# Patient Record
Sex: MALE
[De-identification: names, ages, dates, MRNs, and addresses within clinical notes are randomized per-mention and may not be internally consistent; named-entity substitution may affect disease eponyms.]

---

## 2017-05-09 ENCOUNTER — HOSPITAL ENCOUNTER (INPATIENT)
Dept: HOSPITAL 31 - C.ER | Age: 82
LOS: 3 days | Discharge: HOME | DRG: 390 | End: 2017-05-12
Payer: MEDICARE

## 2017-05-09 DIAGNOSIS — Z90.49: ICD-10-CM

## 2017-05-09 DIAGNOSIS — Z85.038: ICD-10-CM

## 2017-05-09 DIAGNOSIS — E78.1: ICD-10-CM

## 2017-05-09 DIAGNOSIS — E78.5: ICD-10-CM

## 2017-05-09 DIAGNOSIS — J45.909: ICD-10-CM

## 2017-05-09 DIAGNOSIS — I10: ICD-10-CM

## 2017-05-09 DIAGNOSIS — K56.60: Primary | ICD-10-CM

## 2017-05-09 DIAGNOSIS — E55.9: ICD-10-CM

## 2017-05-09 LAB
ALBUMIN/GLOB SERPL: 1.1 {RATIO} (ref 1–2.1)
ALP SERPL-CCNC: 78 U/L (ref 38–126)
ALT SERPL-CCNC: 32 U/L (ref 21–72)
APTT BLD: 30 SECONDS (ref 21–34)
AST SERPL-CCNC: 60 U/L (ref 17–59)
BACTERIA #/AREA URNS HPF: (no result) /[HPF]
BASOPHILS # BLD AUTO: 0 K/UL (ref 0–0.2)
BASOPHILS NFR BLD: 0.3 % (ref 0–2)
BILIRUB SERPL-MCNC: 1.4 MG/DL (ref 0.2–1.3)
BILIRUB UR-MCNC: NEGATIVE MG/DL
BUN SERPL-MCNC: 16 MG/DL (ref 9–20)
CALCIUM SERPL-MCNC: 9.3 MG/DL (ref 8.6–10.4)
CHLORIDE SERPL-SCNC: 99 MMOL/L (ref 98–107)
CO2 SERPL-SCNC: 27 MMOL/L (ref 22–30)
EOSINOPHIL # BLD AUTO: 0 K/UL (ref 0–0.7)
EOSINOPHIL NFR BLD: 0.1 % (ref 0–4)
EOSINOPHIL NFR BLD: 1 % (ref 0–4)
ERYTHROCYTE [DISTWIDTH] IN BLOOD BY AUTOMATED COUNT: 20.3 % (ref 11.5–14.5)
GLOBULIN SER-MCNC: 4.1 GM/DL (ref 2.2–3.9)
GLUCOSE SERPL-MCNC: 104 MG/DL (ref 75–110)
GLUCOSE UR STRIP-MCNC: NORMAL MG/DL
HCT VFR BLD CALC: 36.5 % (ref 35–51)
INR PPP: 1.1
KETONES UR STRIP-MCNC: NEGATIVE MG/DL
LEUKOCYTE ESTERASE UR-ACNC: (no result) LEU/UL
LYMPHOCYTES # BLD AUTO: 0.8 K/UL (ref 1–4.3)
LYMPHOCYTES NFR BLD AUTO: 5.9 % (ref 20–40)
MCH RBC QN AUTO: 22.5 PG (ref 27–31)
MCHC RBC AUTO-ENTMCNC: 31.4 G/DL (ref 33–37)
MCV RBC AUTO: 71.8 FL (ref 80–94)
MONOCYTES # BLD: 0.7 K/UL (ref 0–0.8)
MONOCYTES NFR BLD: 5.5 % (ref 0–10)
NEUTROPHILS NFR BLD AUTO: 87 % (ref 50–75)
NRBC BLD AUTO-RTO: 0.1 % (ref 0–2)
PH UR STRIP: 5 [PH] (ref 5–8)
PLATELET # BLD: 252 K/UL (ref 130–400)
PMV BLD AUTO: 8.8 FL (ref 7.2–11.7)
POTASSIUM SERPL-SCNC: 4.8 MMOL/L (ref 3.6–5.2)
PROT SERPL-MCNC: 8.8 G/DL (ref 6.3–8.3)
PROT UR STRIP-MCNC: (no result) MG/DL
RBC # UR STRIP: NEGATIVE /UL
RBC #/AREA URNS HPF: 3 /HPF (ref 0–3)
SODIUM SERPL-SCNC: 137 MMOL/L (ref 132–148)
SP GR UR STRIP: 1.02 (ref 1–1.03)
TOTAL CELLS COUNTED BLD: 100
UROBILINOGEN UR-MCNC: NORMAL MG/DL (ref 0.2–1)
WBC # BLD AUTO: 13.5 K/UL (ref 4.8–10.8)
WBC #/AREA URNS HPF: 3 /HPF (ref 0–5)

## 2017-05-09 NOTE — C.PDOC
Time Seen by Provider: 05/09/17 15:50


Chief Complaint (Nursing): Abdominal Pain


History Per: Patient, Family


Onset/Duration Of Symptoms: Hrs (since 11am this morning)


Current Symptoms Are (Timing): Still Present


Severity: Moderate


Location Of Pain/Discomfort: Diffuse


Quality Of Discomfort: Unable To Describe, "Pain"


Associated Symptoms: Nausea, Vomiting


Alleviating Factors: None


Last Bowel Movement: Today


Additional History Per: Prior Records





Past Medical History


Reviewed: Historical Data, Nursing Documentation, Vital Signs


Vital Signs: 


 Last Vital Signs











Temp  98 F   05/09/17 20:00


 


Pulse  85   05/09/17 20:00


 


Resp  16   05/09/17 20:00


 


BP  185/89 H  05/09/17 20:00


 


Pulse Ox  98   05/09/17 20:38














- Medical History


PMH: Asthma, Bronchitis, HTN, Hypercholesterolemia, Pneumonia (2011)


Surgical History: Appendectomy, Endoscopy, Hernia Repair


Other Surgeries: Colon resection.





- [facility] Procedures








INSPECTION OF LOWER INTESTINAL TRACT, ENDO (09/25/16)








Family History: States: Unknown Family Hx





- Social History


Hx Tobacco Use: No


Hx Alcohol Use: Yes (social ,last drink yesterday,3 beers)


Hx Substance Use: No





- Immunization History


Hx Tetanus Toxoid Vaccination: No


Hx Influenza Vaccination: Yes


Hx Pneumococcal Vaccination: Yes





Review Of Systems


Except As Marked, All Systems Reviewed And Found Negative.


Constitutional: Negative for: Fever, Weakness


Cardiovascular: Negative for: Chest Pain


Respiratory: Negative for: Shortness of Breath, Hemoptysis


Gastrointestinal: Positive for: Nausea, Vomiting, Abdominal Pain.  Negative for

: Diarrhea, Constipation, Melena, Hematochezia, Hematemesis


Genitourinary: Negative for: Dysuria


Musculoskeletal: Positive for: Back Pain.  Negative for: Neck Pain


Skin: Negative for: Rash


Neurological: Negative for: Weakness, Numbness, Seizures, Altered Mental Status

, Headache





Physical Exam





- Physical Exam


Appears: Non-toxic, No Acute Distress


Skin: Normal Color, Warm, Dry, No Rash


Head: Atraumatic, Normacephalic


Eye(s): bilateral: PERRL, EOMI


Neck: Normal ROM, Supple


Cardiovascular: Rhythm Regular


Respiratory: Normal Breath Sounds, No Accessory Muscle Use


Gastrointestinal/Abdominal: Soft, Tenderness (nonspecific)


Back: No CVA Tenderness


Male Genital: No Testicular Tenderness, No Testicular Swelling, No Scrotal 

Swelling


Extremity: Normal ROM


Neurological/Psych: Oriented x3, Normal Motor, Normal Sensation





ED Course And Treatment





- Laboratory Results


Result Diagrams: 


 05/09/17 16:42





 05/09/17 16:42


O2 Sat by Pulse Oximetry: 98


Pulse Ox Interpretation: Normal





- CT Scan/US


  ** CT abd/pelv


Other Rad Studies (CT/US): Read By Radiologist, Radiology Report Reviewed


CT/US Interpretation: SBO.





- Physician Consult Information


Physician Contacted: Ilya Garcia (Surg.)


Outcome Of Conversation: He will consult. Pt also signed out to the surgical 

resident on call.





Progress





- Interventions


Interventions:: Observation, Intravenous fluid





- Medications Administered


Intravenous: Antiemetic, Other (PPI)





- Data Reviewed


Data Reviewed: Lab, Diagnostic imaging, Old records





- Patient Status


Patient status: Partially improved





- Continuity of Care


Discussed patient case with:: Patient, Family-HIPPA compliant, ED Nurse, PMD


Discussed pt. case with consultant/specialty: General Surgery





- Patient Plan


Patient Plan: Admission





Disposition


Discussed With Dr.: Gem Lewis


Comment: He accepted pt on his service and requested Dr. Garcia be consulted 

for surgery.


Doctor Will See Patient In The: Hospital


Counseled Patient/Family Regarding: Studies Performed, Diagnosis





- Disposition


Disposition: HOSPITALIZED


Disposition Time: 21:03


Condition: GUARDED





- Clinical Impression


Clinical Impression: 


 SBO (small bowel obstruction)

## 2017-05-09 NOTE — CP.PCM.CON
History of Present Illness





- History of Present Illness


History of Present Illness: 





Surgery: Dr. Garcia 





Reason for consult: SBO





CC: vomiting, multiple episodes 





HPI: Patient is an 83 y/o male w/ extensive past surgical history who presents 

complaining of diffuse abdominal pain, nausea, and multiple episodes of 

vomiting that started this morning. Patient describes the emesis is bilious, 

nonbloody. He reports the vomiting started spontaneously this morning without 

provocating event. He complains of excessive belching and abdominal bloating. 

He describes the pain in his stomach as dull mostly in the epigastric area 

radiating to remainder of abdomen and right flank. He reports similar symptoms 

in the past which required a tube in his nose back in 2015. Eventually his 

obstruction resolved with conservative treatment only. 





PMH: HTN, HLD, asthma, colon ca s/p partial colectomy and chemotherapy 


PSH: partial colectomy w/ reanastomosis, at some point patient had ostomy, 

possible diverting (patient unsure), in which he developed a para-stomal hernia 

requiring repair,lap appendectomy, colonoscopy 


social: lives at home alone, performs all ADLS, denies tobacco use, social ETOH 

use 


PMD: Dr. Lewis 





Review of Systems





- Review of Systems


All systems: reviewed and no additional remarkable complaints except


Review of Systems: 





unless stated in HPI





- Constitutional


Constitutional: absent: Chills, Fever





- EENT


Eyes: absent: Blurred Vision, Change in Vision


Ears: Dizziness.  absent: Disequilibrium


Nose/Mouth/Throat: absent: Nasal Congestion, Nasal Trauma





- Cardiovascular


Cardiovascular: absent: Chest Pain, Syncope





- Respiratory


Respiratory: absent: Cough, Wheezing





- Gastrointestinal


Gastrointestinal: Abdominal Pain, Belching, Bloating, Constipation, Cramping, 

Nausea, Vomiting.  absent: Coffee Ground Emesis, Diarrhea





- Genitourinary


Genitourinary: absent: Dysuria, Hematuria





- Musculoskeletal


Musculoskeletal: absent: Numbness, Tingling





- Psychiatric


Psychiatric: absent: Confusion, Depression





- Endocrine


Endocrine: absent: Polydipsia, Polyphagia





- Hematologic/Lymphatic


Hematologic: absent: Easy Bleeding, Easy Bruising





Past Patient History





- Infectious Disease


Hx of Infectious Diseases: None





- Past Medical History & Family History


Past Medical History?: Yes





- Past Social History


Smoking Status: Never Smoked





- CARDIAC


Hx Hypercholesterolemia: Yes


Hx Hypertension: Yes





- PULMONARY


Hx Asthma: Yes


Hx Bronchitis: Yes


Hx Pneumonia: Yes (2011)





- NEUROLOGICAL


Hx Neurological Disorder: No





- HEENT


Hx HEENT Problems: Yes


Other/Comment: Difficulty hearing of R Ear





- RENAL


Hx Chronic Kidney Disease: No





- ENDOCRINE/METABOLIC


Hx Endocrine Disorders: No





- HEMATOLOGICAL/ONCOLOGICAL


Hx Blood Disorders: Yes


Hx Cancer: Yes (colorectal)


Hx Chemotherapy: Yes (2003)





- INTEGUMENTARY


Hx Dermatological Problems: No





- MUSCULOSKELETAL/RHEUMATOLOGICAL


Hx Musculoskeletal Disorders: No


Hx Falls: No





- GASTROINTESTINAL


Hx Gastrointestinal Disorders: Yes


Other/Comment: COLON CANCER





- GENITOURINARY/GYNECOLOGICAL


Hx Genitourinary Disorders: No





- PSYCHIATRIC


Hx Substance Use: No





- SURGICAL HISTORY


Hx Appendectomy: Yes





- ANESTHESIA


Hx Anesthesia: Yes


Hx Anesthesia Reactions: No


Hx Malignant Hyperthermia: No





Meds


Allergies/Adverse Reactions: 


 Allergies











Allergy/AdvReac Type Severity Reaction Status Date / Time


 


diphenhydramine HCl AdvReac  SHORTNESS Verified 09/26/16 17:11





[From Benadryl]   OF BREATH  














Physical Exam





- Constitutional


Appears: Non-toxic, No Acute Distress





- Head Exam


Head Exam: ATRAUMATIC, NORMOCEPHALIC





- Eye Exam


Eye Exam: EOMI, Normal appearance





- ENT Exam


ENT Exam: Mucous Membranes Moist





- Respiratory Exam


Respiratory Exam: NORMAL BREATHING PATTERN.  absent: Respiratory Distress





- Cardiovascular Exam


Cardiovascular Exam: REGULAR RHYTHM.  absent: Tachycardia





- GI/Abdominal Exam


GI & Abdominal Exam: Distended, Soft, Tenderness (mainly epigastric ).  absent: 

Guarding, Rebound, Rigid


Additional comments: 





midline surgical incisions scar noted





- Extremities Exam


Extremities exam: Positive for: normal inspection.  Negative for: calf 

tenderness





- Neurological Exam


Neurological exam: Alert





- Psychiatric Exam


Psychiatric exam: Normal Affect, Normal Mood





- Skin


Skin Exam: Dry, Intact, Warm





Results





- Vital Signs


Recent Vital Signs: 


 Last Vital Signs











Temp  98 F   05/09/17 20:00


 


Pulse  85   05/09/17 20:00


 


Resp  16   05/09/17 20:00


 


BP  185/89 H  05/09/17 20:00


 


Pulse Ox  98   05/09/17 21:04














- Labs


Result Diagrams: 


 05/09/17 16:42





 05/09/17 16:42





Assessment & Plan





- Assessment and Plan (Free Text)


Assessment: 





83 y/o male w/ small bowel obstruction


Plan: 





-CT shows SBO, w/ moderate hiatal hernia


-NGT on low continuous suction


-NPO


-IVFs


-electrolyte repletion prn 


-serial abdominal exams 


-am labs 


-zofran for nausea 


-d/w Dr. Radha Galindo

## 2017-05-10 LAB
ALBUMIN/GLOB SERPL: 1.2 {RATIO} (ref 1–2.1)
ALP SERPL-CCNC: 67 U/L (ref 38–126)
ALT SERPL-CCNC: 30 U/L (ref 21–72)
AST SERPL-CCNC: 29 U/L (ref 17–59)
BASOPHILS # BLD AUTO: 0 K/UL (ref 0–0.2)
BASOPHILS NFR BLD: 0.3 % (ref 0–2)
BILIRUB SERPL-MCNC: 0.9 MG/DL (ref 0.2–1.3)
BUN SERPL-MCNC: 12 MG/DL (ref 9–20)
CALCIUM SERPL-MCNC: 8.6 MG/DL (ref 8.6–10.4)
CHLORIDE SERPL-SCNC: 102 MMOL/L (ref 98–107)
CO2 SERPL-SCNC: 25 MMOL/L (ref 22–30)
EOSINOPHIL # BLD AUTO: 0 K/UL (ref 0–0.7)
EOSINOPHIL NFR BLD: 0.3 % (ref 0–4)
ERYTHROCYTE [DISTWIDTH] IN BLOOD BY AUTOMATED COUNT: 20.3 % (ref 11.5–14.5)
GLOBULIN SER-MCNC: 3.2 GM/DL (ref 2.2–3.9)
GLUCOSE SERPL-MCNC: 87 MG/DL (ref 75–110)
HCT VFR BLD CALC: 32.8 % (ref 35–51)
LYMPHOCYTES # BLD AUTO: 1.1 K/UL (ref 1–4.3)
LYMPHOCYTES NFR BLD AUTO: 13 % (ref 20–40)
MAGNESIUM SERPL-MCNC: 2.3 MG/DL (ref 1.6–2.3)
MCH RBC QN AUTO: 22.8 PG (ref 27–31)
MCHC RBC AUTO-ENTMCNC: 32.3 G/DL (ref 33–37)
MCV RBC AUTO: 70.8 FL (ref 80–94)
MONOCYTES # BLD: 1 K/UL (ref 0–0.8)
MONOCYTES NFR BLD: 11.5 % (ref 0–10)
NRBC BLD AUTO-RTO: 0.1 % (ref 0–2)
PLATELET # BLD: 231 K/UL (ref 130–400)
PMV BLD AUTO: 9.1 FL (ref 7.2–11.7)
POTASSIUM SERPL-SCNC: 3.6 MMOL/L (ref 3.6–5.2)
PROT SERPL-MCNC: 7 G/DL (ref 6.3–8.3)
SODIUM SERPL-SCNC: 137 MMOL/L (ref 132–148)
WBC # BLD AUTO: 8.5 K/UL (ref 4.8–10.8)

## 2017-05-10 NOTE — CP.PCM.PN
Subjective





- Date & Time of Evaluation


Date of Evaluation: 05/10/17


Time of Evaluation: 07:53





- Subjective


Subjective: 





Surgery: Dr. Garcia





Pt seen and examined Resting comfortably in bed. Pain controlled. No N/V. 

Passing flatus no BM.





Objective





- Vital Signs/Intake and Output


Vital Signs (last 24 hours): 


 











Temp Pulse Resp BP Pulse Ox


 


 98.3 F   80   20   156/73 H  96 


 


 05/10/17 00:00  05/10/17 00:00  05/10/17 00:00  05/10/17 00:00  05/10/17 00:00








Intake and Output: 


 











 05/10/17 05/10/17





 06:59 18:59


 


Output Total 1200 


 


Balance -1200 














- Medications


Medications: 


 Current Medications





Heparin Sodium (Porcine) (Heparin)  5,000 units SC Q12 [facility]


Hydromorphone HCl (Dilaudid)  0.5 mg IVP Q4H PRN


   PRN Reason: Pain, moderate (4-7)


Lactated Ringer's (Lactated Ringer's)  1,000 mls @ 120 mls/hr IV .Q8H20M [facility]


   Last Admin: 05/10/17 06:30 Dose:  120 mls/hr


Ondansetron HCl (Zofran Inj)  4 mg IVP Q4 [facility]


   Last Admin: 05/10/17 05:00 Dose:  4 mg


Pantoprazole Sodium (Protonix Inj)  40 mg IVP DAILY [facility]











- Labs


Labs: 


 











PT  12.3 SECONDS (9.7-12.2)  H  05/09/17  16:42    


 


INR  1.1   05/09/17  16:42    


 


APTT  30 SECONDS (21-34)   05/09/17  16:42    














- Constitutional


Appears: Non-toxic, No Acute Distress





- Head Exam


Head Exam: ATRAUMATIC, NORMOCEPHALIC





- Eye Exam


Eye Exam: EOMI





- ENT Exam


ENT Exam: Mucous Membranes Moist, Normal External Ear Exam (NGT in place)





- Neck Exam


Neck Exam: Full ROM





- Respiratory Exam


Respiratory Exam: NORMAL BREATHING PATTERN.  absent: Accessory Muscle Use, 

Respiratory Distress





- GI/Abdominal Exam


GI & Abdominal Exam: Firm (B/L lower quadrants), Soft.  absent: Distended, 

Guarding, Rigid, Tenderness, Rebound





- Extremities Exam


Extremities Exam: absent: Calf Tenderness, Pedal Edema





- Neurological Exam


Neurological Exam: Alert, Awake, Oriented x3





- Psychiatric Exam


Psychiatric exam: Normal Mood





Assessment and Plan





- Assessment and Plan (Free Text)


Assessment: 





82M w. SBO 


-NGT 1100cc initially in ED, 300cs overnight, non-bilious


-Keep NGT to suction


-NPO


-IVF


-pain meds


-serial exams


-monitor bowel fxn


-d/w attending





[facility] PGY2

## 2017-05-10 NOTE — CT
PROCEDURE:  CT Abdomen and Pelvis with contrast



HISTORY:

Abd pain and vomiting.



COMPARISON:

None.



TECHNIQUE:

Contrast dose: 100 mL Visipaque 320



Radiation dose:



Total exam DLP = 489.65 mGy-cm.



This CT exam was performed using one or more of the following dose 

reduction techniques: Automated exposure control, adjustment of the 

mA and/or kV according to patient size, and/or use of iterative 

reconstruction technique.



FINDINGS:



LOWER THORAX:

Moderate hiatal hernia. 2 mm subpleural nodule anterior segment right 

upper lobe.  4 mm subpleural nodule in right middle lobe. The 4 mm 

nodule is evident on prior examinations dating back to 1/30/2015. 4 

mm nodule abutting or within the left major fissure, in the superior 

segment left lower lobe.  Possible intrapulmonary lymph node.  This 

is not included on prior examinations. By a Fleischner society 

criteria, no followup is advised for these nodules, given their size. 



LIVER:

Unremarkable. No gross lesion or ductal dilatation. 



GALLBLADDER AND BILE DUCTS:

Cholelithiasis. No mural thickening or pericholecystic fluid. 



PANCREAS:

Unremarkable.8 mm rounded fat density mass in the distal pancreatic 

body, likely a lipoma.  Unchanged compared to several prior 

examinations. No other pancreatic mass.  No evidence of pancreatitis. 

No ductal dilatation. .



SPLEEN:

Unremarkable. 



ADRENALS:

Unremarkable. No mass. 



KIDNEYS AND URETERS:

Right upper pole cortical cyst, 1.6 cm. 10 mm cyst mid left kidney, 

stable. No calculus or hydronephrosis. 



VASCULATURE:

Unremarkable. No aortic aneurysm. 



BOWEL:

Mechanical small-bowel obstruction. Point of obstruction identified.  

This can be demonstrated on series 3, image 125 and series 601, image 

30. This is approximately at the level of the distal jejunum or 

jejunoileal junction. Decompressed small bowel loops distal to this 

point of caliber change. Status post sigmoidectomy with colo rectal 

anastomosis. Evidence of prior appendectomy. 



APPENDIX:

Status post appendectomy 



PERITONEUM:

Right ventral abdominal mesh noted. No ascites. 



LYMPH NODES:

Unremarkable. No enlarged lymph nodes. 



BLADDER:

Unremarkable. 



REPRODUCTIVE:

Normal prostate 



BONES:

Stable small rounded lucent lesion in the left side of the L1 

vertebral body, unchanged compared to 1/30/2015. 6 mm diameter.  No 

other lytic or blastic lesions identified. Nonspecific finding. 



OTHER FINDINGS:

None.



IMPRESSION:

Mechanical small-bowel obstruction at approximately the level of the 

jejunoileal junction. Moderate hiatal hernia. Cholelithiasis without 

evidence of cholecystitis. Additional minor findings as detailed 

above.



Preliminary interpretation of this examination was reported by 

[facility] at 8:03 p.m. on 5/9/2017. There is concurrence of 

this report with the preliminary interpretation.

## 2017-05-11 LAB
ALBUMIN/GLOB SERPL: 1.2 {RATIO} (ref 1–2.1)
ALP SERPL-CCNC: 65 U/L (ref 38–126)
ALT SERPL-CCNC: 24 U/L (ref 21–72)
AST SERPL-CCNC: 28 U/L (ref 17–59)
BASOPHILS # BLD AUTO: 0 K/UL (ref 0–0.2)
BASOPHILS NFR BLD: 0.4 % (ref 0–2)
BILIRUB SERPL-MCNC: 0.8 MG/DL (ref 0.2–1.3)
BUN SERPL-MCNC: 9 MG/DL (ref 9–20)
CALCIUM SERPL-MCNC: 8.5 MG/DL (ref 8.6–10.4)
CHLORIDE SERPL-SCNC: 107 MMOL/L (ref 98–107)
CO2 SERPL-SCNC: 26 MMOL/L (ref 22–30)
EOSINOPHIL # BLD AUTO: 0.1 K/UL (ref 0–0.7)
EOSINOPHIL NFR BLD: 2.5 % (ref 0–4)
ERYTHROCYTE [DISTWIDTH] IN BLOOD BY AUTOMATED COUNT: 19.7 % (ref 11.5–14.5)
GLOBULIN SER-MCNC: 3.2 GM/DL (ref 2.2–3.9)
GLUCOSE SERPL-MCNC: 83 MG/DL (ref 75–110)
HCT VFR BLD CALC: 32.8 % (ref 35–51)
LYMPHOCYTES # BLD AUTO: 1 K/UL (ref 1–4.3)
LYMPHOCYTES NFR BLD AUTO: 17.6 % (ref 20–40)
MCH RBC QN AUTO: 22.6 PG (ref 27–31)
MCHC RBC AUTO-ENTMCNC: 31.8 G/DL (ref 33–37)
MCV RBC AUTO: 71.1 FL (ref 80–94)
MONOCYTES # BLD: 0.7 K/UL (ref 0–0.8)
MONOCYTES NFR BLD: 11.7 % (ref 0–10)
NRBC BLD AUTO-RTO: 0 % (ref 0–2)
PLATELET # BLD: 216 K/UL (ref 130–400)
PMV BLD AUTO: 8.8 FL (ref 7.2–11.7)
POTASSIUM SERPL-SCNC: 3.9 MMOL/L (ref 3.6–5.2)
PROT SERPL-MCNC: 6.9 G/DL (ref 6.3–8.3)
SODIUM SERPL-SCNC: 138 MMOL/L (ref 132–148)
WBC # BLD AUTO: 5.9 K/UL (ref 4.8–10.8)

## 2017-05-11 NOTE — CARD
--------------- APPROVED REPORT --------------





EKG Measurement

Heart [id number]

ID [id number]

[id number]

[id number]

[id number]



<Conclusion>

Normal sinus rhythm

Nonspecific ST and T wave abnormality

Abnormal ECG

## 2017-05-11 NOTE — CP.PCM.PN
Subjective





- Date & Time of Evaluation


Date of Evaluation: 05/11/17


Time of Evaluation: 09:45





- Subjective


Subjective: 





Surgery: Dr. Garcia 





Patient doing well today. Patient denies abdominal pain, n/v/f/c. He reports 2 

BMs, one last night and 1 this am. + flatus. Tolerating clears.





Objective





- Vital Signs/Intake and Output


Vital Signs (last 24 hours): 


 











Temp Pulse Resp BP Pulse Ox


 


 97.8 F   67   20   164/78 H  97 


 


 05/11/17 00:00  05/11/17 06:00  05/11/17 06:00  05/11/17 06:00  05/11/17 06:00








Intake and Output: 


 











 05/11/17 05/11/17





 06:59 18:59


 


Intake Total 1300 


 


Output Total 900 


 


Balance 400 














- Medications


Medications: 


 Current Medications





Heparin Sodium (Porcine) (Heparin)  5,000 units SC Q12 [facility]


   Last Admin: 05/10/17 22:26 Dose:  5,000 units


Hydromorphone HCl (Dilaudid)  0.5 mg IVP Q4H PRN


   PRN Reason: Pain, moderate (4-7)


Lactated Ringer's (Lactated Ringer's)  1,000 mls @ 120 mls/hr IV .Q8H20M [facility]


   Last Admin: 05/10/17 23:30 Dose:  Not Given


Ondansetron HCl (Zofran Inj)  4 mg IVP Q4 [facility]


   Last Admin: 05/11/17 04:45 Dose:  4 mg


Pantoprazole Sodium (Protonix Inj)  40 mg IVP DAILY [facility]


   Last Admin: 05/10/17 11:35 Dose:  40 mg











- Labs


Labs: 


 





 05/11/17 07:04 





 05/11/17 07:04 





 











PT  12.3 SECONDS (9.7-12.2)  H  05/09/17  16:42    


 


INR  1.1   05/09/17  16:42    


 


APTT  30 SECONDS (21-34)   05/09/17  16:42    














- Constitutional


Appears: Non-toxic, No Acute Distress





- Head Exam


Head Exam: ATRAUMATIC, NORMOCEPHALIC





- Eye Exam


Eye Exam: EOMI, Normal appearance





- ENT Exam


ENT Exam: Mucous Membranes Moist





- Respiratory Exam


Respiratory Exam: NORMAL BREATHING PATTERN.  absent: Respiratory Distress





- Cardiovascular Exam


Cardiovascular Exam: REGULAR RHYTHM.  absent: Tachycardia





- GI/Abdominal Exam


GI & Abdominal Exam: Soft.  absent: Distended, Guarding, Tenderness, Rebound





- Neurological Exam


Neurological Exam: Alert, Awake





Assessment and Plan





- Assessment and Plan (Free Text)


Assessment: 





81 y/o male w/ SBO-resolving 


Plan: 





-ADAT 


-encourage ambulation


-serial abdominal exams


-if continues to tolerate diet and have bowel function, is cleared from 

surgical standpoint


-further recs per Dr. Radha Galindo PGY1

## 2017-05-11 NOTE — HP
HISTORY OF PRESENT ILLNESS:  This is an 82-years old  male with history 
of multiple medical problems who presented to Emergency Room with abdominal 
pain and vomiting for 1 days duration.  The patient had previous similar 
presentation 

CAT scan of the abdomen and pelvis was done in the Emergency Room and was 
diagnosed with small-bowel obstruction , cholelithiasis without evidence of 
cholecystitis  .  



REVIEW OF SYSTEMS: Other review of systems is negative.



ALLERGIES: NKA.



MEDICATIONS:  Norvasc 5 mg daily, Ultram 50 mg daily, fenofibrate 200 mg daily, 
Crestor 10 mg daily and aspirin 81 mg daily.



SOCIAL HISTORY:  No history of smoking, ETOH or substance abuse.



FAMILY HISTORY:  Not contributory.



PAST MEDICAL HISTORY:  History of  hypertension .



PHYSICAL EXAMINATION:

GENERAL:  The patient is in bed, comfortable, not in cardiopulmonary distress.

VITAL SIGNS: /70  temperature 98.2, respiratory rate 20 and pulse is 71.

HEENT:  Pupils equal and reactive to light.  Normal-appearing mucosa of the 
conjunctivae, oropharyngeal and nasal membrane mucosa.

NECK:  Supple, no JVD, no carotid bruit, no lymph node and no thyromegaly.

CHEST AND LUNGS:  Bilateral symmetrical expansion, good air exchange, no rales 
and no rhonchi.

CARDIOVASCULAR:  PMI not localized.  S1, S2.  No additional sounds.

ABDOMEN:  Normoactive bowel sounds, no tenderness, no organomegaly and no 
masses.

EXTREMITIES:  No cyanosis, no clubbing , no edema



ASSESSMENT:  

1.  Small bowel obstruction. 

2. History of hypertension.

3.  Hypertriglyceridemia.



PLAN:  At this point, we will remove the nasogastric tube as the patient passed 
flatus and abdominal pain is less.  We will advanc diet to clear liquids as 
tolerated.





__________________________________________

Gem Lewis MD







cc:   



DD: 05/10/2017 20:28:39  167

TT: 05/10/2017 21:04:20

Job # [id number]

sn

MTDD

## 2017-05-11 NOTE — PN
DATE: 05/11/2017



SUBJECTIVE:  The patient is seen today.  He has less abdominal pain, and he is 
still on liquid diet and IV fluids.



PHYSICAL EXAMINATION:

VITAL SIGNS:  Blood pressure 135/75, temperature 98.7, respiratory rate 20, and 
pulse 67.

HEENT:  Pupils equal, reactive to light.  Normal-appearing mucosa of the 
conjunctivae, oropharyngeal, and nasal membrane mucosa.

NECK:  Supple.  No JVD.  No carotid bruit.  No lymph node.  No thyromegaly.

CHEST AND LUNGS:  Bilateral symmetrical expansion.  No rhonchi.

CARDIOVASCULAR:  PMI not localized.  S1, S2.  No additional sounds.

ABDOMEN:  Normoactive bowel sounds.  No tenderness.  No organomegaly.  No 
masses.

EXTREMITIES:  No cyanosis.  No clubbing.  No edema.

CENTRAL NERVOUS SYSTEM:  Alert, awake, oriented x 3.  No neurological deficits 
could be appreciated.



ASSESSMENT:

Small-bowel obstruction, which is resolving, and currently the patient is 
tolerating liquid diet.  



PLAN:

We will advance to regular diet, and if the patient is tolerating, we will 
discharge home.  We will discontinue IV fluid.





__________________________________________

Samephilipp Lewis MD







cc:   



DD: 05/11/2017 23:26:00  167

TT: 05/11/2017 23:57:56

Confirmation # [id number]

Dictation # [id number]

tn

[email or web address]

## 2017-05-12 VITALS
DIASTOLIC BLOOD PRESSURE: 74 MMHG | SYSTOLIC BLOOD PRESSURE: 160 MMHG | HEART RATE: 72 BPM | TEMPERATURE: 98.1 F | OXYGEN SATURATION: 95 %

## 2017-05-12 VITALS — RESPIRATION RATE: 20 BRPM

## 2017-05-12 NOTE — CP.PCM.PN
Subjective





- Date & Time of Evaluation


Date of Evaluation: 05/12/17


Time of Evaluation: 08:30





- Subjective


Subjective: 





PGY1 Progress Note for Dr. Garcia:





Patient seen and examined.  Patient states he feels well and denies abdominal 

pain, nausea, vomiting. Patient reports BM this morning and states he is eating 

well. 





Objective





- Vital Signs/Intake and Output


Vital Signs (last 24 hours): 


 











Temp Pulse Resp BP Pulse Ox


 


 98.1 F   72   20   160/74 H  95 


 


 05/12/17 08:10  05/12/17 08:10  05/12/17 08:10  05/12/17 08:10  05/12/17 08:10








Intake and Output: 


 











 05/12/17 05/12/17





 06:59 18:59


 


Intake Total 240 


 


Balance 240 














- Medications


Medications: 


 Current Medications





Heparin Sodium (Porcine) (Heparin)  5,000 units SC Q12 [facility]


   Last Admin: 05/11/17 22:00 Dose:  5,000 units


Ondansetron HCl (Zofran Inj)  4 mg IVP Q4 [facility]


   Last Admin: 05/12/17 08:03 Dose:  4 mg


Pantoprazole Sodium (Protonix Inj)  40 mg IVP DAILY [facility]


   Last Admin: 05/12/17 08:03 Dose:  40 mg











- Labs


Labs: 


 





 05/11/17 07:04 





 05/11/17 07:04 





 











PT  12.3 SECONDS (9.7-12.2)  H  05/09/17  16:42    


 


INR  1.1   05/09/17  16:42    


 


APTT  30 SECONDS (21-34)   05/09/17  16:42    














- Constitutional


Appears: Non-toxic, No Acute Distress





- Head Exam


Head Exam: ATRAUMATIC, NORMOCEPHALIC





- Eye Exam


Eye Exam: EOMI





- ENT Exam


ENT Exam: Mucous Membranes Moist





- Respiratory Exam


Respiratory Exam: NORMAL BREATHING PATTERN





- GI/Abdominal Exam


GI & Abdominal Exam: Soft.  absent: Distended, Guarding, Tenderness





- Neurological Exam


Neurological Exam: Alert, Awake





- Psychiatric Exam


Psychiatric exam: Normal Affect





- Skin


Skin Exam: Warm





Assessment and Plan





- Assessment and Plan (Free Text)


Assessment: 





82 year old male with resolving SBO





-patient tolerating regular diet


-patient cleared for discharge from surgical standpoint


-D/W Dr. Garcia

## 2017-05-12 NOTE — CP.PCM.PN
Subjective





- Date & Time of Evaluation


Date of Evaluation: 05/12/17


Time of Evaluation: 11:00





- Subjective


Subjective: 





Awake, alert, no abdominal pain, tolerating regular diet, NAD.





Objective





- Vital Signs/Intake and Output


Vital Signs (last 24 hours): 


 











Temp Pulse Resp BP Pulse Ox


 


 98.1 F   72   20   160/74 H  95 


 


 05/12/17 08:10  05/12/17 08:10  05/12/17 08:10  05/12/17 08:10  05/12/17 08:10








Intake and Output: 


 











 05/12/17 05/12/17





 06:59 18:59


 


Intake Total 240 500


 


Balance 240 500














- Labs


Labs: 


 





 05/11/17 07:04 





 05/11/17 07:04 





 











PT  12.3 SECONDS (9.7-12.2)  H  05/09/17  16:42    


 


INR  1.1   05/09/17  16:42    


 


APTT  30 SECONDS (21-34)   05/09/17  16:42    














Assessment and Plan





- Assessment and Plan (Free Text)


Assessment: 





Patient admitted with small bowel obstruction, seen and examined, tolerating 

regular diet, no abdominal pain. Cleared by surgery. D/W DR Lewis, plan for 

discharge home today. Advised to follow up in the office in 1 week.

## 2017-05-14 NOTE — DS
This is a late entry for discharge summary done on 5/12/2017.



REASON FOR ADMISSION:  This is an 82-year-old  male with history of multiple medical problems
 including previous abdominal surgeries who was admitted for small-bowel obstruction.



COURSE OF HOSPITALIZATION:  The patient was admitted to medical floor, and he had a nasogastric tube 
placed.  The patient had a surgical consultation done by Dr. Garcia.  The patient's abdominal pain
 was relieved, and the patient started to have flatus.  The patient's nasogastric tube was removed, a
nd started on liquid diet that he tolerated well.  Diet was advanced to regular diet, and the patient
 tolerated and discharged home in a stable condition to continue the preadmission medications.



FINAL DIAGNOSES:  Mechanical small-bowel obstruction.  Vitamin D deficiency.  Hypertriglyceridemia.





__________________________________________

Gem Lewis MD







cc:



DD: 05/13/2017 20:51:27  167

TT: 05/14/2017 09:13:56

Job # [id number]

jn

## 2017-09-04 ENCOUNTER — HOSPITAL ENCOUNTER (EMERGENCY)
Dept: HOSPITAL 31 - C.ER | Age: 82
LOS: 1 days | Discharge: HOME | End: 2017-09-05
Payer: MEDICARE

## 2017-09-04 DIAGNOSIS — R11.10: Primary | ICD-10-CM

## 2017-09-04 DIAGNOSIS — I10: ICD-10-CM

## 2017-09-04 LAB
ALBUMIN/GLOB SERPL: 1.2 {RATIO} (ref 1–2.1)
ALP SERPL-CCNC: 67 U/L (ref 38–126)
ALT SERPL-CCNC: 28 U/L (ref 21–72)
AST SERPL-CCNC: 24 U/L (ref 17–59)
BACTERIA #/AREA URNS HPF: (no result) /[HPF]
BASOPHILS # BLD AUTO: 0 K/UL (ref 0–0.2)
BASOPHILS NFR BLD: 0.2 % (ref 0–2)
BASOPHILS NFR BLD: 1 % (ref 0–2)
BILIRUB SERPL-MCNC: 0.6 MG/DL (ref 0.2–1.3)
BILIRUB UR-MCNC: NEGATIVE MG/DL
BUN SERPL-MCNC: 13 MG/DL (ref 9–20)
CALCIUM SERPL-MCNC: 9.6 MG/DL (ref 8.6–10.4)
CHLORIDE SERPL-SCNC: 106 MMOL/L (ref 98–107)
CO2 SERPL-SCNC: 20 MMOL/L (ref 22–30)
EOSINOPHIL # BLD AUTO: 0 K/UL (ref 0–0.7)
EOSINOPHIL NFR BLD: 0.3 % (ref 0–4)
ERYTHROCYTE [DISTWIDTH] IN BLOOD BY AUTOMATED COUNT: 18.9 % (ref 11.5–14.5)
GLOBULIN SER-MCNC: 3.4 GM/DL (ref 2.2–3.9)
GLUCOSE SERPL-MCNC: 134 MG/DL (ref 75–110)
GLUCOSE UR STRIP-MCNC: NORMAL MG/DL
HCT VFR BLD CALC: 33.7 % (ref 35–51)
KETONES UR STRIP-MCNC: NEGATIVE MG/DL
LEUKOCYTE ESTERASE UR-ACNC: (no result) LEU/UL
LYMPHOCYTES # BLD AUTO: 0.7 K/UL (ref 1–4.3)
LYMPHOCYTES NFR BLD AUTO: 7.1 % (ref 20–40)
MCH RBC QN AUTO: 23.5 PG (ref 27–31)
MCHC RBC AUTO-ENTMCNC: 32.7 G/DL (ref 33–37)
MCV RBC AUTO: 71.8 FL (ref 80–94)
MONOCYTES # BLD: 0.8 K/UL (ref 0–0.8)
MONOCYTES NFR BLD: 8.1 % (ref 0–10)
NEUTROPHILS NFR BLD AUTO: 85 % (ref 50–75)
NRBC BLD AUTO-RTO: 0 % (ref 0–2)
PH UR STRIP: 7 [PH] (ref 5–8)
PLATELET # BLD: 245 K/UL (ref 130–400)
PMV BLD AUTO: 8 FL (ref 7.2–11.7)
POTASSIUM SERPL-SCNC: 4.1 MMOL/L (ref 3.6–5.2)
PROT SERPL-MCNC: 7.6 G/DL (ref 6.3–8.3)
PROT UR STRIP-MCNC: NEGATIVE MG/DL
RBC # UR STRIP: NEGATIVE /UL
RBC #/AREA URNS HPF: 1 /HPF (ref 0–3)
SODIUM SERPL-SCNC: 144 MMOL/L (ref 132–148)
SP GR UR STRIP: 1.01 (ref 1–1.03)
TOTAL CELLS COUNTED BLD: 100
UROBILINOGEN UR-MCNC: NORMAL MG/DL (ref 0.2–1)
WBC # BLD AUTO: 10.4 K/UL (ref 4.8–10.8)
WBC #/AREA URNS HPF: 1 /HPF (ref 0–5)

## 2017-09-04 PROCEDURE — 99284 EMERGENCY DEPT VISIT MOD MDM: CPT

## 2017-09-04 PROCEDURE — 80053 COMPREHEN METABOLIC PANEL: CPT

## 2017-09-04 PROCEDURE — 74177 CT ABD & PELVIS W/CONTRAST: CPT

## 2017-09-04 PROCEDURE — 96375 TX/PRO/DX INJ NEW DRUG ADDON: CPT

## 2017-09-04 PROCEDURE — 85025 COMPLETE CBC W/AUTO DIFF WBC: CPT

## 2017-09-04 PROCEDURE — 83690 ASSAY OF LIPASE: CPT

## 2017-09-04 PROCEDURE — 93005 ELECTROCARDIOGRAM TRACING: CPT

## 2017-09-04 PROCEDURE — 81001 URINALYSIS AUTO W/SCOPE: CPT

## 2017-09-04 PROCEDURE — 96374 THER/PROPH/DIAG INJ IV PUSH: CPT

## 2017-09-04 NOTE — C.PDOC
Time Seen by Provider: 17 22:54


Chief Complaint (Nursing): Abdominal Pain


History Per: Patient, Family


Onset/Duration Of Symptoms: Hrs (this afternoon)


Current Symptoms Are (Timing): Better


Severity: Moderate


Location Of Pain/Discomfort: Periumbilical


Quality Of Discomfort: "Pain"


Associated Symptoms: Nausea, Vomiting (x2)


Alleviating Factors: None


Last Bowel Movement: Today


Additional History Per: Prior Records





Past Medical History


Reviewed: Historical Data, Nursing Documentation, Vital Signs


Vital Signs: 


 Last Vital Signs











Temp  97.4 F L  17 22:42


 


Pulse  84   17 01:30


 


Resp  16   17 01:30


 


BP  158/80 H  17 01:30


 


Pulse Ox  98   17 01:30














- Medical History


PMH: Asthma, Bronchitis, HTN, Hypercholesterolemia, Pneumonia ()


Surgical History: Appendectomy, Endoscopy, Hernia Repair





- [facility] Procedures








INSPECTION OF LOWER INTESTINAL TRACT, ENDO (16)








Family History: States: Unknown Family Hx





- Social History


Hx Tobacco Use: No


Hx Alcohol Use: No


Hx Substance Use: No





- Immunization History


Hx Tetanus Toxoid Vaccination: Yes


Hx Influenza Vaccination: Yes


Hx Pneumococcal Vaccination: Yes





Review Of Systems


Except As Marked, All Systems Reviewed And Found Negative.


Constitutional: Negative for: Fever, Weakness


Cardiovascular: Negative for: Chest Pain


Respiratory: Negative for: Shortness of Breath


Gastrointestinal: Negative for: Constipation, Melena, Hematochezia, Hematemesis


Genitourinary: Negative for: Dysuria


Musculoskeletal: Negative for: Neck Pain, Back Pain


Skin: Negative for: Rash


Neurological: Negative for: Weakness, Numbness, Seizures, Altered Mental Status





Physical Exam





- Physical Exam


Appears: Non-toxic, No Acute Distress


Skin: Normal Color, Warm, Dry


Head: Atraumatic, Normacephalic


Eye(s): bilateral: PERRL, EOMI


Oral Mucosa: Moist


Neck: Normal ROM, Supple


Cardiovascular: Rhythm Regular


Respiratory: Normal Breath Sounds, No Accessory Muscle Use


Gastrointestinal/Abdominal: Soft, No Tenderness


Back: No CVA Tenderness


Extremity: Normal ROM


Neurological/Psych: Oriented x3, Normal Motor, Normal Sensation





ED Course And Treatment





- Laboratory Results


Result Diagrams: 


 17 23:21





 17 23:21


ECG: Interpreted By Me, Viewed By Me


ECG Rhythm: Sinus Rhythm


ECG Interpretation: No Acute Changes


Rate From EC


O2 Sat by Pulse Oximetry: 99


Pulse Ox Interpretation: Normal





- CT Scan/US


  ** CT abd/pelv


Other Rad Studies (CT/US): Read By Radiologist, Radiology Report Reviewed


CT/US Interpretation: IMPRESSION:  There are a few nondistended loops of small 

bowel in the pelvis and lower abdomen but it is unclear.  if these are 

peristaltic or represent short strictures. There is however, no holdup of 

enteric contrast.  Additional findings as described above.


Progress Note: Pt is tolerating PO. No vomiting or abdominal pain during ED 

stay.


Reassessment Condition: Improved





Progress





- Interventions


Interventions:: Observation, Intravenous fluid





- Medications Administered


Oral: Antihypertensive


Intravenous: Antiemetic





- Data Reviewed


Data Reviewed: Lab, Diagnostic imaging, EKG, Old records





- Patient Status


Patient status: Mostly improved





- Continuity of Care


Discussed patient case with:: Patient, Family-HIPPA compliant, ED Nurse





- Patient Plan


Patient Plan: Discharge, F/U with PCP, Continue present meds





Disposition


Counseled Patient/Family Regarding: Studies Performed, Diagnosis, Need For 

Followup, Rx Given





- Disposition


Referrals: 


Gem Lewis MD [Staff Provider] - 


Disposition: HOME/ ROUTINE


Disposition Time: 02:09


Condition: IMPROVED


Additional Instructions: 


Follow up with your doctor this week. Return to the ER if you develop fever, 

vomiting, abdominal pain, worsening of symptoms or if you have any other 

concerns. 


Prescriptions: 


Ondansetron [Zofran] 4 mg PO Q8H PRN #15 tab


 PRN Reason: Nausea/Vomiting


Pantoprazole Sodium [Protonix] 40 mg PO DAILY #14 ect


Instructions:  Acute Nausea and Vomiting (ED)


Forms:  [employer] (Micronesian)


Print Language: Chadian





- Clinical Impression


Clinical Impression: 


 Vomiting

## 2017-09-05 VITALS
RESPIRATION RATE: 18 BRPM | HEART RATE: 76 BPM | OXYGEN SATURATION: 98 % | TEMPERATURE: 97.9 F | SYSTOLIC BLOOD PRESSURE: 139 MMHG | DIASTOLIC BLOOD PRESSURE: 73 MMHG

## 2017-09-05 NOTE — CT
PROCEDURE:  CT abdomen and pelvis dated 5/9/ 2017 



HISTORY:

Abdominal and vomiting, h/o SBO



COMPARISON:

Comparison made with the CT scan of the abdomen and pelvis 09/25/2016.



TECHNIQUE:

Contiguous axial images of the abdomen and performed following oral 

and intravenous injection of approximately 100 cc Visipaque 320 

contrast material. Given. Coronal and Sagittal reformats generated. 



Radiation dose:



Total exam DLP = 444.12 mGy-cm.



This CT exam was performed using one or more of the following dose 

reduction techniques: Automated exposure control, adjustment of the 

mA and/or kV according to patient size, and/or use of iterative 

reconstruction technique.



FINDINGS:



LOWER THORAX:

Re- demonstrated small subpleural nodules within the anteromedial 

aspect of the medial segment right middle lobe and another along the 

anterolateral convexity. Mild bibasilar atelectasis.  No evidence of 

basilar effusion or pneumothorax. Large hiatal hernia again noted. 

Heart size within range of normal. No pericardial effusion.



LIVER:

The liver exhibits normal size measuring 12.4 cm in CC dimension. 

Mild fatty hepatic infiltration. Portal and splenic veins are 

opacified. No obvious hepatic mass or collection seen. 



GALLBLADDER AND BILE DUCTS:

Multiple intraluminal gallbladder calculi.  Gallbladder appears 

contracted.  No obvious pericholecystic fluid collections seen. . 



PANCREAS:

Unremarkable. No mass. No ductal dilatation.



SPLEEN:

Spleen exhibits normal size and attenuation pattern. 



ADRENALS:

No adrenal masses. 



KIDNEYS AND URETERS:

. The kidneys demonstrate relatively symmetric nephrograms.  No 

evidence of nephrolithiasis. Prominent extrarenal pelves bilaterally 

right larger than left.  There is mild columnization of both proximal 

and mid ureters.  No evidence of intraluminal urinary.  There is a 

small partially exophytic cyst posterior upper pole left kidney. 



BLADDER:

Urinary bladder is physiologically distended. No evidence of 

intraluminal urinary bladder calculi.  Prostate gland measures 



REPRODUCTIVE:

Prostate gland measures approximately 3.4 cm. 



APPENDIX:

Appendix is not visualized on this study.  Clinic correlation with 

surgical history recommended.



BOWEL:

Evaluation of the bowel is limited due to incomplete opacification.  

Stomach is incompletely distended which may in part account 

thick-walled appearance.  Gastritis not excluded. . . 



Few loops of nondistended small bowel in the lower abdomen exhibit 

minimal wall thickening nonspecific.  This could represent 

peristaltic waves .  Possibility of localized wall thickening due to 

inflammatory process or stricture not excluded. Clinical correlation 

recommended. .  No evidence of acute complete mechanical small bowel 

obstruction. There appears to be an anastomosis involving the distal 

descending/sigmoid colon. Moderate amount. .  Moderate amount of 

stool fluid present within the cecum ascending colon nonspecific.  

Rule out diarrheal illness. 



PERITONEUM:

No gross free intraperitoneal air.  No free fluid.



LYMPH NODES:

No significant adenopathy. 



VASCULATURE:

Mild atherosclerotic plaque changes seen along the abdominal aorta 

and iliac arteries.  No evidence of abdominal aortic or iliac artery 

aneurysm. 



BONES:

Mild multilevel degenerative spondylosis of the lumbar and to a 

lesser degree lower thoracic spine



OTHER FINDINGS:

None. 



IMPRESSION:

Moderate size hiatal hernia. Mild bibasilar atelectasis. 2 small 

vague nodular densities right middle lobe.



Mild fatty hepatic infiltration. Cholelithiasis. 



There are a few narrowed the loops of small bowel on the lower 

abdomen/ pelvis with wall thickening. Findings could represent 

peristalsis however nonspecific localized inflammatory process/ 

enteritis or stricture not excluded however there is no evidence of 

complete small bowel obstruction. Stool fluid and air is present 

within the large bowel nonspecific.  Rule out a diarrheal illness. 

Apparent postoperative changes of anastomosis involving the distal 

descending/ proximal sigmoid colon.  Correlation with surgical 

history recommended.

## 2017-11-23 ENCOUNTER — HOSPITAL ENCOUNTER (INPATIENT)
Dept: HOSPITAL 31 - C.ER | Age: 82
LOS: 12 days | Discharge: HOME | DRG: 415 | End: 2017-12-05
Payer: MEDICARE

## 2017-11-23 DIAGNOSIS — K57.90: ICD-10-CM

## 2017-11-23 DIAGNOSIS — Z90.49: ICD-10-CM

## 2017-11-23 DIAGNOSIS — D62: ICD-10-CM

## 2017-11-23 DIAGNOSIS — Z79.899: ICD-10-CM

## 2017-11-23 DIAGNOSIS — Z79.82: ICD-10-CM

## 2017-11-23 DIAGNOSIS — K80.01: Primary | ICD-10-CM

## 2017-11-23 DIAGNOSIS — J45.909: ICD-10-CM

## 2017-11-23 DIAGNOSIS — K56.50: ICD-10-CM

## 2017-11-23 DIAGNOSIS — K21.9: ICD-10-CM

## 2017-11-23 DIAGNOSIS — J98.11: ICD-10-CM

## 2017-11-23 DIAGNOSIS — I10: ICD-10-CM

## 2017-11-23 DIAGNOSIS — Z87.01: ICD-10-CM

## 2017-11-23 DIAGNOSIS — E78.00: ICD-10-CM

## 2017-11-23 DIAGNOSIS — Z85.038: ICD-10-CM

## 2017-11-23 LAB
ALBUMIN/GLOB SERPL: 1.3 {RATIO} (ref 1–2.1)
ALP SERPL-CCNC: 89 U/L (ref 38–126)
ALT SERPL-CCNC: 42 U/L (ref 21–72)
AST SERPL-CCNC: 31 U/L (ref 17–59)
BASOPHILS # BLD AUTO: 0 K/UL (ref 0–0.2)
BASOPHILS NFR BLD: 0.4 % (ref 0–2)
BILIRUB SERPL-MCNC: 0.8 MG/DL (ref 0.2–1.3)
BILIRUB UR-MCNC: NEGATIVE MG/DL
BUN SERPL-MCNC: 12 MG/DL (ref 9–20)
CALCIUM SERPL-MCNC: 8.9 MG/DL (ref 8.6–10.4)
CHLORIDE SERPL-SCNC: 100 MMOL/L (ref 98–107)
CO2 SERPL-SCNC: 23 MMOL/L (ref 22–30)
EOSINOPHIL # BLD AUTO: 0.2 K/UL (ref 0–0.7)
EOSINOPHIL NFR BLD: 2.5 % (ref 0–4)
ERYTHROCYTE [DISTWIDTH] IN BLOOD BY AUTOMATED COUNT: 18.8 % (ref 11.5–14.5)
GLOBULIN SER-MCNC: 3.6 GM/DL (ref 2.2–3.9)
GLUCOSE SERPL-MCNC: 109 MG/DL (ref 75–110)
GLUCOSE UR STRIP-MCNC: NORMAL MG/DL
HCT VFR BLD CALC: 36 % (ref 35–51)
KETONES UR STRIP-MCNC: NEGATIVE MG/DL
LEUKOCYTE ESTERASE UR-ACNC: (no result) LEU/UL
LYMPHOCYTES # BLD AUTO: 1.3 K/UL (ref 1–4.3)
LYMPHOCYTES NFR BLD AUTO: 16.6 % (ref 20–40)
MCH RBC QN AUTO: 23.8 PG (ref 27–31)
MCHC RBC AUTO-ENTMCNC: 32.4 G/DL (ref 33–37)
MCV RBC AUTO: 73.4 FL (ref 80–94)
MONOCYTES # BLD: 0.7 K/UL (ref 0–0.8)
MONOCYTES NFR BLD: 9.3 % (ref 0–10)
NRBC BLD AUTO-RTO: 0.1 % (ref 0–2)
PH UR STRIP: 6 [PH] (ref 5–8)
PLATELET # BLD: 237 K/UL (ref 130–400)
PMV BLD AUTO: 8.7 FL (ref 7.2–11.7)
POTASSIUM SERPL-SCNC: 3.8 MMOL/L (ref 3.6–5.2)
PROT SERPL-MCNC: 8.2 G/DL (ref 6.3–8.3)
PROT UR STRIP-MCNC: (no result) MG/DL
RBC # UR STRIP: (no result) /UL
RBC #/AREA URNS HPF: 2 /HPF (ref 0–3)
SODIUM SERPL-SCNC: 136 MMOL/L (ref 132–148)
SP GR UR STRIP: 1.01 (ref 1–1.03)
UROBILINOGEN UR-MCNC: NORMAL MG/DL (ref 0.2–1)
WBC # BLD AUTO: 7.6 K/UL (ref 4.8–10.8)
WBC #/AREA URNS HPF: < 1 /HPF (ref 0–5)

## 2017-11-23 NOTE — RAD
PROCEDURE:  CHEST RADIOGRAPH, 1 VIEW



HISTORY:

abd pain



COMPARISON:

Comparison is made to 09/25/2016



FINDINGS:



LUNGS:

No evidence of acute pulmonary disease.



PLEURA:

No pneumothorax or pleural fluid seen.



CARDIOVASCULAR:

Normal.



OSSEOUS STRUCTURES:

No significant abnormalities.



VISUALIZED UPPER ABDOMEN:

Possible retrocardiac hiatus hernia.



OTHER FINDINGS:

None. 



IMPRESSION:

No evidence of acute pulmonary disease.

## 2017-11-23 NOTE — CT
EXAM:

  CT Abdomen and Pelvis With Intravenous Contrast



CLINICAL HISTORY:

  82 years old, male; Pain; Abdominal pain; Generalized; Additional info: Abd 

pain



TECHNIQUE:

  Axial computed tomography images of the abdomen and pelvis with intravenous 

contrast.  All CT scans at this facility use one or more dose reduction 

techniques, viz.: automated exposure control; ma/kV adjustment per patient size 

(including targeted exams where dose is matched to indication; i.e. head); or 

iterative reconstruction technique.

  Coronal and sagittal reformatted images were created and reviewed.



CONTRAST:

  100 mL of VISIPAQUE 320 administered intravenously.



COMPARISON:

  No relevant prior studies available.



FINDINGS:

  Lower thorax:  Mild atelectasis/scarring.  0.3 cm subpleural nodule vs focal 

scarring RIGHT middle lobe.  Coronary artery calcifications.  Moderate-sized 

hiatal hernia.



 ABDOMEN:

  Liver:  Unremarkable.  No mass.

  Gallbladder and bile ducts:  Calcified gallstones.  No ductal dilation.

  Pancreas:  No ductal dilation.  No mass.

  Spleen:  No splenomegaly.

  Adrenals:  No mass.

  Kidneys and ureters:  Few probable renal cysts.  No hydronephrosis.

  Stomach and bowel:  Postsurgical changes of sigmoid colon.  Mild-to-moderate 

stool within nondilated colon proximal to anastomosis.  Collapse of sigmoid 

colon distal to anastomosis.  Few scattered diverticula within colon.  No 

associated inflammatory stranding.  No definite mural thickening.  No 

obstruction.

  Appendix:  Appendectomy.



 PELVIS:

  Bladder:  Unremarkable.

  Reproductive:  Unremarkable as visualized.



 ABDOMEN and PELVIS:

  Intraperitoneal space:  No significant fluid collection.  No free air.

  Bones/joints:  Degenerative changes of spine.  No acute fracture.

  Soft tissues:  Probable scarring midline anterior abdominal wall.  

Postsurgical changes of RIGHT anterior abdominal wall.

  Vasculature:  Moderate atherosclerotic disease.  No aneurysm.

  Lymph nodes:  No pathologically enlarged lymph nodes.



IMPRESSION:     

1.  Diverticulosis without CT evidence of diverticulitis.

2.  Postsurgical changes of sigmoid colon with collapsed bowel distal to 

anastomosis.  Stricture not entirely excluded.  Clinical correlation is needed.

3.  Cholelithiasis.

4.  Incidental/non-acute findings are described above.

## 2017-11-23 NOTE — C.PDOC
History Of Present Illness


82-year-old male, presents to the emergency department with complaints of 

vomiting and epigastric abdominal pain that started at 03:00 this morning, 

Patient states he has a Hx of appendectomy, colon resection due to colon CA and 

hernia repair. Denies any fevers or chills. No other complaints at this time. 

Patient NPO since 12:00


Time Seen by Provider: 11/23/17 16:05


History Per: Patient


History/Exam Limitations: no limitations


Onset/Duration Of Symptoms: Hrs





Past Medical History


Reviewed: Historical Data, Nursing Documentation, Vital Signs


Vital Signs: 


 Last Vital Signs











Temp  97.6 F   11/23/17 16:08


 


Pulse  78   11/23/17 19:16


 


Resp  19   11/23/17 18:13


 


BP  201/88 H  11/23/17 19:16


 


Pulse Ox  93 L  11/23/17 21:20














- Medical History


PMH: Asthma, Bronchitis, HTN, Hypercholesterolemia, Pneumonia (2011)


Surgical History: Appendectomy, Endoscopy, Hernia Repair





- [facility] Procedures








INSPECTION OF LOWER INTESTINAL TRACT, ENDO (09/25/16)








Family History: States: No Known Family Hx





- Social History


Hx Tobacco Use: No


Hx Alcohol Use: No


Hx Substance Use: No





- Immunization History


Hx Tetanus Toxoid Vaccination: Yes


Hx Influenza Vaccination: Yes


Hx Pneumococcal Vaccination: Yes





Review Of Systems


Except As Marked, All Systems Reviewed And Found Negative.


Constitutional: Negative for: Fever, Chills


Cardiovascular: Negative for: Chest Pain


Respiratory: Negative for: Shortness of Breath


Gastrointestinal: Positive for: Nausea, Vomiting, Abdominal Pain.  Negative for

: Diarrhea


Musculoskeletal: Negative for: Back Pain





Physical Exam





- Physical Exam


Appears: Non-toxic, No Acute Distress


Skin: Warm, Dry, No Rash


Head: Atraumatic, Normacephalic


Eye(s): bilateral: Normal Inspection, PERRL


Nose: Normal


Oral Mucosa: Moist


Lips: Normal Appearing


Neck: Normal ROM


Chest: Symmetrical


Cardiovascular: Rhythm Regular, No Murmur


Respiratory: Normal Breath Sounds, No Accessory Muscle Use


Gastrointestinal/Abdominal: Soft, Tenderness (mild, epigastric), Distention (

mild), No Guarding, No Rebound


Extremity: Normal ROM


Neurological/Psych: Oriented x3, Normal Speech





ED Course And Treatment





- Laboratory Results


Result Diagrams: 


 11/23/17 16:53





 11/23/17 16:50


O2 Sat by Pulse Oximetry: 93





- Radiology


CXR: Interpreted by Me


CXR Interpretation: Yes: No Acute Disease





Progress





- Re-Evaluation


Re-evaluation Note: 





11/23/17 17:30


APPEARS COMFORTABLE NAD NO VOMIT SINCE PRIOR EXAM


11/23/17 17:57


+VOMIT. LIMITED PO CONTRAST INTAKE. PHENERGAN GIVEN


11/23/17 19:30


RECUR PAIN. UNABLE TO COMPLETE PO CONTRAST.


11/23/17 21:15


CT REPORT REVIEWED. POSSIBLE OBSTRUCTION? PRIOR HO BOWEL OBSTRUCTION


D/W DR LEWIS WILL ADMIT





- Data Reviewed


Data Reviewed: Lab, Diagnostic imaging, EKG, Old records





Disposition


Counseled Patient/Family Regarding: Studies Performed, Diagnosis





- Disposition


Disposition: HOSPITALIZED


Disposition Time: 21:18


Condition: STABLE





- POA


Present On Arrival: None





- Clinical Impression


Clinical Impression: 


 Abdominal pain, Abnormal abdominal CT scan, Vomiting








- Scribe Statement


The provider has reviewed the documentation as recorded by the Scribe (Jamila Thrasher)


All medical record entries made by the Scribe were at my direction and 

personally dictated by me. I have reviewed the chart and agree that the record 

accurately reflects my personal performance of the history, physical exam, 

medical decision making, and the department course for this patient. I have 

also personally directed, reviewed, and agree with the discharge instructions 

and disposition.





Decision To Admit





- Pt Status Changed To:


Hospital Disposition Of: Observation





- .


Bed Request Type: Regular


Admitting Physician: Gem Lewis


Patient Diagnosis: 


 Abdominal pain, Abnormal abdominal CT scan, Vomiting

## 2017-11-24 LAB
APTT BLD: 32 SECONDS (ref 21–34)
BASOPHILS # BLD AUTO: 0.1 K/UL (ref 0–0.2)
BASOPHILS NFR BLD: 0.5 % (ref 0–2)
BUN SERPL-MCNC: 11 MG/DL (ref 9–20)
CALCIUM SERPL-MCNC: 8.3 MG/DL (ref 8.6–10.4)
CHLORIDE SERPL-SCNC: 98 MMOL/L (ref 98–107)
CO2 SERPL-SCNC: 24 MMOL/L (ref 22–30)
EOSINOPHIL # BLD AUTO: 0 K/UL (ref 0–0.7)
EOSINOPHIL NFR BLD: 0.1 % (ref 0–4)
ERYTHROCYTE [DISTWIDTH] IN BLOOD BY AUTOMATED COUNT: 18.1 % (ref 11.5–14.5)
ERYTHROCYTE [DISTWIDTH] IN BLOOD BY AUTOMATED COUNT: 18.5 % (ref 11.5–14.5)
GLUCOSE SERPL-MCNC: 104 MG/DL (ref 75–110)
HCT VFR BLD CALC: 34.9 % (ref 35–51)
HCT VFR BLD CALC: 35.3 % (ref 35–51)
INR PPP: 1.3
LYMPHOCYTES # BLD AUTO: 1 K/UL (ref 1–4.3)
LYMPHOCYTES NFR BLD AUTO: 5.5 % (ref 20–40)
MCH RBC QN AUTO: 23.3 PG (ref 27–31)
MCH RBC QN AUTO: 23.5 PG (ref 27–31)
MCHC RBC AUTO-ENTMCNC: 31.9 G/DL (ref 33–37)
MCHC RBC AUTO-ENTMCNC: 32 G/DL (ref 33–37)
MCV RBC AUTO: 73 FL (ref 80–94)
MCV RBC AUTO: 73.3 FL (ref 80–94)
MONOCYTES # BLD: 1.8 K/UL (ref 0–0.8)
MONOCYTES NFR BLD: 9.6 % (ref 0–10)
NEUTROPHILS NFR BLD AUTO: 83 % (ref 50–75)
NRBC BLD AUTO-RTO: 0 % (ref 0–2)
PLATELET # BLD: 211 K/UL (ref 130–400)
PLATELET # BLD: 215 K/UL (ref 130–400)
PMV BLD AUTO: 9 FL (ref 7.2–11.7)
PMV BLD AUTO: 9 FL (ref 7.2–11.7)
POTASSIUM SERPL-SCNC: 3.7 MMOL/L (ref 3.6–5.2)
SODIUM SERPL-SCNC: 132 MMOL/L (ref 132–148)
TOTAL CELLS COUNTED BLD: 100
WBC # BLD AUTO: 17.6 K/UL (ref 4.8–10.8)
WBC # BLD AUTO: 18.5 K/UL (ref 4.8–10.8)

## 2017-11-24 RX ADMIN — ENOXAPARIN SODIUM SCH MG: 40 INJECTION SUBCUTANEOUS at 09:44

## 2017-11-24 RX ADMIN — DEXTROSE AND SODIUM CHLORIDE SCH MLS/HR: 5; 900 INJECTION, SOLUTION INTRAVENOUS at 02:27

## 2017-11-24 RX ADMIN — DEXTROSE AND SODIUM CHLORIDE SCH: 5; 900 INJECTION, SOLUTION INTRAVENOUS at 15:19

## 2017-11-24 RX ADMIN — PANTOPRAZOLE SODIUM SCH MG: 40 TABLET, DELAYED RELEASE ORAL at 09:44

## 2017-11-24 NOTE — CP.PCM.PN
Subjective





- Date & Time of Evaluation


Date of Evaluation: 11/24/17


Time of Evaluation: 17:01





- Subjective


Subjective: 





DISCUSSED PT CASE W DR. SHEIKH VIA TELEPHONE.  PT SEEN BY SURGICAL TEAM.  WILL 

ORDER FULL LIQUIDS PER DR. SHEIKH.  ALSO NOTIFIED DR. SHEIKH OF WBC 17.6 FROM 7.6 

YESTERDAY (SEE OFFICIAL LAB RESULTS).  DR. SHEIKH WAS NEVER INFORMED OF LAB 

RESULTS.  ONE DOSE OF BOTH CIPRO 400 MG IV AND FLAGYL 500 MG IV ORDERED PER MD 

REQUEST.  STAT CBC AND BLOOD CULTURES ORDERED AND DR. SHEIKH TO BE NOTIFIED THIS 

EVENING OF CBC RESULTS.  DISCUSSED THIS WITH PREVIOUS RN REGINE AND ONCOMING RN 

ROMI.  NO FURTHER ORDERS AT THIS TIME. 





Objective





- Vital Signs/Intake and Output


Vital Signs (last 24 hours): 


 











Temp Pulse Resp BP Pulse Ox


 


 99.1 F   87   20   150/68   97 


 


 11/24/17 09:31  11/24/17 09:31  11/24/17 09:31  11/24/17 09:31  11/24/17 09:31








Intake and Output: 


 











 11/24/17 11/24/17





 06:59 18:59


 


Intake Total 500 800


 


Output Total 1100 0


 


Balance -600 800














- Medications


Medications: 


 Current Medications





Amlodipine Besylate (Norvasc)  5 mg PO DAILY [facility]


   Last Admin: 11/24/17 09:44 Dose:  5 mg


Aspirin (Ecotrin)  81 mg PO DAILY [facility]


   Last Admin: 11/24/17 09:44 Dose:  81 mg


Enoxaparin Sodium (Lovenox)  40 mg SC DAILY [facility]


   Last Admin: 11/24/17 09:44 Dose:  40 mg


Fenofibrate (Tricor)  145 mg PO DAILY [facility]


   Last Admin: 11/24/17 09:45 Dose:  145 mg


Dextrose/Sodium Chloride (Dextrose 5%/0.9% Ns 1000 Ml)  1,000 mls @ 100 mls/hr 

IV .Q10H [facility]


   Last Admin: 11/24/17 15:19 Dose:  Not Given


Ciprofloxacin (Cipro 400mg/200ml Dsw)  400 mg in 200 mls @ 133 mls/hr IVPB STAT 

[facility]


   Stop: 11/24/17 18:23


Metronidazole (Flagyl)  500 mg in 100 mls @ 100 mls/hr IVPB STAT [facility]


   Stop: 11/24/17 17:52


Ondansetron HCl (Zofran Tab)  4 mg PO Q8H PRN


   PRN Reason: Nausea/Vomiting


Pantoprazole Sodium (Protonix Ec Tab)  40 mg PO DAILY [facility]


   Last Admin: 11/24/17 09:44 Dose:  40 mg


Rosuvastatin Calcium (Crestor)  10 mg PO DAILY [facility]











- Labs


Labs: 


 





 11/24/17 11:15 





 11/24/17 11:15 





 











PT  14.4 SECONDS (9.7-12.2)  H  11/24/17  11:15    


 


INR  1.3   11/24/17  11:15    


 


APTT  32 SECONDS (21-34)   11/24/17  11:15

## 2017-11-24 NOTE — CP.PCM.PN
Subjective





- Date & Time of Evaluation


Date of Evaluation: 11/24/17


Time of Evaluation: 11:37





- Subjective


Subjective: 





gb ct scan shows stones 





this might be cause of pain





Objective





- Vital Signs/Intake and Output


Vital Signs (last 24 hours): 


 











Temp Pulse Resp BP Pulse Ox


 


 99.1 F   87   20   150/68   97 


 


 11/24/17 09:31  11/24/17 09:31  11/24/17 09:31  11/24/17 09:31  11/24/17 09:31








Intake and Output: 


 











 11/24/17 11/24/17





 06:59 18:59


 


Intake Total 500 


 


Output Total 1100 


 


Balance -600 














- Medications


Medications: 


 Current Medications





Amlodipine Besylate (Norvasc)  5 mg PO DAILY [facility]


   Last Admin: 11/24/17 09:44 Dose:  5 mg


Aspirin (Ecotrin)  81 mg PO DAILY [facility]


   Last Admin: 11/24/17 09:44 Dose:  81 mg


Enoxaparin Sodium (Lovenox)  40 mg SC DAILY [facility]


   Last Admin: 11/24/17 09:44 Dose:  40 mg


Fenofibrate (Tricor)  145 mg PO DAILY [facility]


   Last Admin: 11/24/17 09:45 Dose:  145 mg


Dextrose/Sodium Chloride (Dextrose 5%/0.9% Ns 1000 Ml)  1,000 mls @ 100 mls/hr 

IV .Q10H [facility]


   Last Admin: 11/24/17 02:27 Dose:  100 mls/hr


Ondansetron HCl (Zofran Tab)  4 mg PO Q8H PRN


   PRN Reason: Nausea/Vomiting


Pantoprazole Sodium (Protonix Ec Tab)  40 mg PO DAILY [facility]


   Last Admin: 11/24/17 09:44 Dose:  40 mg


Rosuvastatin Calcium (Crestor)  10 mg PO DAILY [facility]











- Labs


Labs: 


 





 11/24/17 11:15 





 11/23/17 16:50

## 2017-11-24 NOTE — CP.PCM.CON
History of Present Illness





- History of Present Illness


History of Present Illness: 





General Surgery Consult Note for Dr. Rosenbaum


Reason for Consult: suspected SBO





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 

hypercholesteremia was admitted for abdominal pain and nausea/vomting for 1 

day. General surgery consulted for suspected SBO based on CT findings. Patient 

states he has had this pain in the past where he was diagnosed with SBO. He 

states pain occurred suddenly. He also reported multiple episodes of nonbloody, 

nonbilious vomiting. Patient reports that symptoms resolved when he arrived at 

the hospital. He rated pain as moderate. He described the pain as constant as 

sharp located in the epigastric region. He is currently denying pain and other 

symptoms. Denies alleviating and exacerbating factors. Patient is having 

flatus. No other complaints at this time.





PMD: Dr. Lewis





PMH: Smal bowel CA, Asthma, HTN, Hypercholesterolemia


Meds: as per EMR


Allergy: Diphenhydramine


PSH: Appendectomy, Endoscopy, Hernia Repair, small bowel resection


FH: unknown


Social: denies tobacco/EtOH/illicit drug use





Review of Systems





- Constitutional


Constitutional: absent: Chills, Fever





- EENT


Eyes: absent: Change in Vision, Discharge, Requires Corrective Lenses


Ears: absent: Ear Discharge, Tinnitus, Dizziness


Nose/Mouth/Throat: absent: Nasal Discharge, Sore Throat, Neck Mass





- Cardiovascular


Cardiovascular: absent: Chest Pain, Chest Pain at Rest, Chest Pain with Activity

, Diaphoresis, Dyspnea, Dyspnea on Exertion





- Respiratory


Respiratory: absent: Cough, Dyspnea, Hemoptysis, Wheezing





- Gastrointestinal


Gastrointestinal: Abdominal Pain, Nausea, Vomiting.  absent: Diarrhea





- Genitourinary


Genitourinary: absent: Change in Urinary Stream, Difficulty Urinating, Dysuria





- Musculoskeletal


Musculoskeletal: absent: Back Pain, Numbness, Tingling





- Integumentary


Integumentary: absent: Changing Lesions, New Lesions





- Neurological


Neurological: absent: Numbness, Tingling, Vertigo, Weakness





- Psychiatric


Psychiatric: absent: Anxiety, Depression, Homicidal Ideation, Suicidal Ideation





- Endocrine


Endocrine: absent: Fatigue, Palpitations





- Hematologic/Lymphatic


Hematologic: absent: Easy Bleeding, Easy Bruising, Lymphadenopathy





Past Patient History





- Infectious Disease


Hx of Infectious Diseases: None





- Past Medical History & Family History


Past Medical History?: Yes





- Past Social History


Smoking Status: Never Smoked





- CARDIAC


Hx Hypercholesterolemia: Yes


Hx Hypertension: Yes





- PULMONARY


Hx Asthma: Yes


Hx Bronchitis: Yes


Hx Pneumonia: Yes (2011)





- NEUROLOGICAL


Hx Neurological Disorder: No





- HEENT


Hx HEENT Problems: Yes


Other/Comment: Difficulty hearing of R Ear





- RENAL


Hx Chronic Kidney Disease: No





- ENDOCRINE/METABOLIC


Hx Endocrine Disorders: No





- HEMATOLOGICAL/ONCOLOGICAL


Hx Blood Disorders: Yes


Hx Cancer: Yes (colorectal)


Hx Chemotherapy: Yes (2003)





- INTEGUMENTARY


Hx Dermatological Problems: No





- MUSCULOSKELETAL/RHEUMATOLOGICAL


Hx Musculoskeletal Disorders: No


Hx Falls: No





- GASTROINTESTINAL


Hx Gastrointestinal Disorders: Yes


Other/Comment: COLON CANCER





- GENITOURINARY/GYNECOLOGICAL


Hx Genitourinary Disorders: No





- PSYCHIATRIC


Hx Substance Use: No





- SURGICAL HISTORY


Hx Appendectomy: Yes





- ANESTHESIA


Hx Anesthesia: Yes


Hx Anesthesia Reactions: No


Hx Malignant Hyperthermia: No





Meds


Allergies/Adverse Reactions: 


 Allergies











Allergy/AdvReac Type Severity Reaction Status Date / Time


 


diphenhydramine HCl AdvReac   Verified 11/23/17 16:11





[From Benadryl]     














- Medications


Medications: 


 Current Medications





Amlodipine Besylate (Norvasc)  5 mg PO DAILY [facility]


Aspirin (Ecotrin)  81 mg PO DAILY [facility]


Home Med (Fenofibrate)  200 mg PO DAILY [facility]


Dextrose/Sodium Chloride (Dextrose 5%/0.9% Ns 1000 Ml)  1,000 mls @ 100 mls/hr 

IV .Q10H [facility]


   Last Admin: 11/24/17 02:27 Dose:  100 mls/hr


Ondansetron HCl (Zofran Tab)  4 mg PO Q8H PRN


   PRN Reason: Nausea/Vomiting


Pantoprazole Sodium (Protonix Ec Tab)  40 mg PO DAILY [facility]


Rosuvastatin Calcium (Crestor)  10 mg PO DAILY [facility]











Physical Exam





- Constitutional


Appears: Non-toxic, No Acute Distress





- Head Exam


Head Exam: ATRAUMATIC, NORMOCEPHALIC





- Eye Exam


Eye Exam: Normal appearance





- ENT Exam


ENT Exam: Mucous Membranes Moist





- Respiratory Exam


Respiratory Exam: NORMAL BREATHING PATTERN





- Cardiovascular Exam


Cardiovascular Exam: REGULAR RHYTHM





- GI/Abdominal Exam


GI & Abdominal Exam: Normal Bowel Sounds, Soft.  absent: Distended, Firm, 

Guarding, Rebound, Rigid, Tenderness


Additional comments: 





midline scar from bowel rection


laparoscopic scars from appendectomy





- Extremities Exam


Extremities exam: Negative for: calf tenderness





- Back Exam


Back exam: absent: CVA tenderness (L), CVA tenderness (R)





- Neurological Exam


Neurological exam: Alert, CN II-XII Intact, Oriented x3





- Psychiatric Exam


Psychiatric exam: Normal Affect, Normal Mood





- Skin


Skin Exam: Dry, Intact, Normal Color, Warm





Results





- Vital Signs


Recent Vital Signs: 


 Last Vital Signs











Temp  97.7 F   11/24/17 00:25


 


Pulse  90   11/24/17 00:25


 


Resp  18   11/24/17 00:25


 


BP  158/75 H  11/24/17 00:25


 


Pulse Ox  98   11/24/17 00:25














- Labs


Result Diagrams: 


 11/23/17 16:53





 11/23/17 16:50


Labs: 


 Laboratory Results - last 24 hr











  11/23/17 11/23/17 11/23/17





  16:50 16:50 16:53


 


WBC    7.6


 


RBC    4.91


 


Hgb    11.7 L


 


Hct    36.0


 


MCV    73.4 L


 


MCH    23.8 L


 


MCHC    32.4 L


 


RDW    18.8 H


 


Plt Count    237


 


MPV    8.7


 


Neut % (Auto)    71.2


 


Lymph % (Auto)    16.6 L


 


Mono % (Auto)    9.3


 


Eos % (Auto)    2.5


 


Baso % (Auto)    0.4


 


Neut #    5.4


 


Lymph #    1.3


 


Mono #    0.7


 


Eos #    0.2


 


Baso #    0.0


 


Sodium   136 


 


Potassium   3.8 


 


Chloride   100 


 


Carbon Dioxide   23 


 


Anion Gap   16 


 


BUN   12 


 


Creatinine   1.0 


 


Est GFR ( Amer)   > 60 


 


Est GFR (Non-Af Amer)   > 60 


 


Random Glucose   109 


 


Calcium   8.9 


 


Total Bilirubin   0.8 


 


AST   31 


 


ALT   42 


 


Alkaline Phosphatase   89 


 


Troponin I   < 0.0120 


 


Total Protein   8.2 


 


Albumin   4.6 


 


Globulin   3.6 


 


Albumin/Globulin Ratio   1.3 


 


Lipase   84 


 


Urine Color  Yellow  


 


Urine Clarity  Clear  


 


Urine pH  6.0  


 


Ur Specific Gravity  1.013  


 


Urine Protein  1+ H  


 


Urine Glucose (UA)  Normal  


 


Urine Ketones  Negative  


 


Urine Blood  Trace H  


 


Urine Nitrate  Negative  


 


Urine Bilirubin  Negative  


 


Urine Urobilinogen  Normal  


 


Ur Leukocyte Esterase  Neg  


 


Urine WBC (Auto)  < 1  


 


Urine RBC (Auto)  2  


 


Ur Squamous Epith Cells  < 1  














Assessment & Plan





- Assessment and Plan (Free Text)


Plan: 





82 M with abdominal pain and nausea/vomiting; CT findings of diverticulosis and 

collapsed small bowel at presumed anastomosis





-Patient asymptomatic currently and passing flatus


-ADAT


-Iv fluids


-Serial abdominal exams


-Management as per primary


-Will follow


-Discussed with Dr. Ilsa Stephens PGY1

## 2017-11-25 RX ADMIN — CIPROFLOXACIN SCH MLS/HR: 2 INJECTION, SOLUTION INTRAVENOUS at 04:24

## 2017-11-25 RX ADMIN — WATER SCH MLS/HR: 1 INJECTION INTRAMUSCULAR; INTRAVENOUS; SUBCUTANEOUS at 17:18

## 2017-11-25 RX ADMIN — WATER SCH MLS/HR: 1 INJECTION INTRAMUSCULAR; INTRAVENOUS; SUBCUTANEOUS at 08:09

## 2017-11-25 RX ADMIN — WATER SCH MLS/HR: 1 INJECTION INTRAMUSCULAR; INTRAVENOUS; SUBCUTANEOUS at 00:22

## 2017-11-25 RX ADMIN — ENOXAPARIN SODIUM SCH MG: 40 INJECTION SUBCUTANEOUS at 10:36

## 2017-11-25 RX ADMIN — CIPROFLOXACIN SCH MLS/HR: 2 INJECTION, SOLUTION INTRAVENOUS at 17:18

## 2017-11-25 RX ADMIN — DEXTROSE AND SODIUM CHLORIDE SCH: 5; 900 INJECTION, SOLUTION INTRAVENOUS at 10:27

## 2017-11-25 RX ADMIN — DEXTROSE AND SODIUM CHLORIDE SCH MLS/HR: 5; 900 INJECTION, SOLUTION INTRAVENOUS at 04:25

## 2017-11-25 RX ADMIN — PANTOPRAZOLE SODIUM SCH MG: 40 TABLET, DELAYED RELEASE ORAL at 10:36

## 2017-11-25 NOTE — CP.PCM.PN
Subjective





- Date & Time of Evaluation


Date of Evaluation: 11/25/17


Time of Evaluation: 07:15





- Subjective


Subjective: 





General Surgery- Dr. Rosenbaum 





Patient seen and examined at bedside this AM. No acute events overnight. 

patient abdominal pain localized to RUQ. pain managable w/ current pain 

regiment. Denies current fevers, chills, chest pain, SOB, N/V/D





Objective





- Vital Signs/Intake and Output


Vital Signs (last 24 hours): 


 











Temp Pulse Resp BP Pulse Ox


 


 99.5 F   90   20   147/69   96 


 


 11/25/17 08:54  11/25/17 08:54  11/25/17 08:54  11/25/17 08:54  11/25/17 08:54








Intake and Output: 


 











 11/25/17 11/25/17





 06:59 18:59


 


Intake Total 800 


 


Output Total 800 


 


Balance 0 














- Medications


Medications: 


 Current Medications





Acetaminophen (Tylenol 325mg Tab)  650 mg PO Q6 PRN


   PRN Reason: Temperature


   Last Admin: 11/24/17 17:56 Dose:  650 mg


Amlodipine Besylate (Norvasc)  5 mg PO DAILY [facility]


   Last Admin: 11/24/17 09:44 Dose:  5 mg


Aspirin (Ecotrin)  81 mg PO DAILY [facility]


   Last Admin: 11/24/17 09:44 Dose:  81 mg


Enoxaparin Sodium (Lovenox)  40 mg SC DAILY [facility]


   Last Admin: 11/24/17 09:44 Dose:  40 mg


Fenofibrate (Tricor)  145 mg PO DAILY [facility]


   Last Admin: 11/24/17 09:45 Dose:  145 mg


Dextrose/Sodium Chloride (Dextrose 5%/0.9% Ns 1000 Ml)  1,000 mls @ 100 mls/hr 

IV .Q10H [facility]


   Last Admin: 11/25/17 04:25 Dose:  100 mls/hr


Ciprofloxacin (Cipro 400mg/200ml Dsw)  400 mg in 200 mls @ 133 mls/hr IVPB Q12H 

[facility]


   Last Admin: 11/25/17 04:24 Dose:  133 mls/hr


Metronidazole (Flagyl)  500 mg in 100 mls @ 100 mls/hr IVPB Q8H [facility]


   Last Admin: 11/25/17 08:09 Dose:  100 mls/hr


Ondansetron HCl (Zofran Tab)  4 mg PO Q8H PRN


   PRN Reason: Nausea/Vomiting


Pantoprazole Sodium (Protonix Ec Tab)  40 mg PO DAILY [facility]


   Last Admin: 11/24/17 09:44 Dose:  40 mg


Rosuvastatin Calcium (Crestor)  10 mg PO DAILY [facility]


   Last Admin: 11/24/17 21:29 Dose:  10 mg











- Labs


Labs: 


 





 11/24/17 18:06 





 11/24/17 11:15 





 











PT  14.4 SECONDS (9.7-12.2)  H  11/24/17  11:15    


 


INR  1.3   11/24/17  11:15    


 


APTT  32 SECONDS (21-34)   11/24/17  11:15    














- Constitutional


Appears: Non-toxic, No Acute Distress





- Head Exam


Head Exam: ATRAUMATIC





- Eye Exam


Eye Exam: EOMI.  absent: Scleral icterus





- ENT Exam


ENT Exam: Mucous Membranes Moist





- Respiratory Exam


Respiratory Exam: absent: Accessory Muscle Use, Respiratory Distress





- Cardiovascular Exam


Cardiovascular Exam: +S1, +S2





- GI/Abdominal Exam


GI & Abdominal Exam: Distended, Soft, Tenderness.  absent: Firm, Guarding, Rigid


Additional comments: 





tender to palpation in RUQ





- Neurological Exam


Neurological Exam: Alert, Awake, Oriented x3





- Psychiatric Exam


Psychiatric exam: Normal Affect





- Skin


Skin Exam: Intact, Warm





Assessment and Plan





- Assessment and Plan (Free Text)


Assessment: 





82M w/ RUQ pain, cholelithiasis vs acute cholecystitis


Plan: 





- f/u GB US and HIDA scan


- pain control and anti-emetic PRN


- IVF and Abx


- medical management per primary team


- further recs per Dr. Ilsa Olivares PGY1

## 2017-11-25 NOTE — US
HISTORY:

Abdominal pain r/o cholecystitis



COMPARISON:

None.



TECHNIQUE:

Sonographic evaluation of the right upper quadrant of the abdomen. 

Study is somewhat limited due to body habitus and bowel gas. 



FINDINGS:



LIVER:

Measures approximately 14 cm in length. Liver demonstrates smooth 

contour though somewhat decreased echotexture nonspecific. 

Echogenicity of the liver parenchyma.  No mass. No intrahepatic bile 

duct dilatation. 



GALLBLADDER:

The gallbladder is physiologically distended. Intraluminal 

gallbladder calculi. Gallbladder wall is mildly thickened and 

edematous in appearance measuring approximately 6 mm. No 

pericholecystic fluid collections. Technologist indicates positive 

sonographic Torres sign elicited during this procedure. 



COMMON BILE DUCT:

Mildly dilated measuring approximately 7.2 mm. Mm.  No stones. No 

dilatation.



PANCREAS:

Unremarkable as visualized. No mass. No ductal dilatation.



RIGHT KIDNEY:

Measures approximately 9.9 x 5.8 x 4.7 cm in length. Normal 

echogenicity. No calculus, mass, or hydronephrosis.



AORTA:

Poorly visualized



IVC:

Not visualized



OTHER FINDINGS:

None .



IMPRESSION:

Cholelithiasis with mild gallbladder wall edema/thickening and 

positive sonographic Torres sign.

## 2017-11-26 LAB
ALBUMIN/GLOB SERPL: 1.1 {RATIO} (ref 1–2.1)
ALP SERPL-CCNC: 79 U/L (ref 38–126)
ALT SERPL-CCNC: 42 U/L (ref 21–72)
AST SERPL-CCNC: 31 U/L (ref 17–59)
BASOPHILS # BLD AUTO: 0 K/UL (ref 0–0.2)
BASOPHILS NFR BLD: 0.3 % (ref 0–2)
BILIRUB SERPL-MCNC: 1.2 MG/DL (ref 0.2–1.3)
BUN SERPL-MCNC: 8 MG/DL (ref 9–20)
CALCIUM SERPL-MCNC: 7.9 MG/DL (ref 8.6–10.4)
CHLORIDE SERPL-SCNC: 104 MMOL/L (ref 98–107)
CO2 SERPL-SCNC: 20 MMOL/L (ref 22–30)
EOSINOPHIL # BLD AUTO: 0 K/UL (ref 0–0.7)
EOSINOPHIL NFR BLD: 0.1 % (ref 0–4)
ERYTHROCYTE [DISTWIDTH] IN BLOOD BY AUTOMATED COUNT: 18.7 % (ref 11.5–14.5)
GLOBULIN SER-MCNC: 3 GM/DL (ref 2.2–3.9)
GLUCOSE SERPL-MCNC: 100 MG/DL (ref 75–110)
HCT VFR BLD CALC: 31.3 % (ref 35–51)
LYMPHOCYTES # BLD AUTO: 0.8 K/UL (ref 1–4.3)
LYMPHOCYTES NFR BLD AUTO: 5.3 % (ref 20–40)
MCH RBC QN AUTO: 23.6 PG (ref 27–31)
MCHC RBC AUTO-ENTMCNC: 32.5 G/DL (ref 33–37)
MCV RBC AUTO: 72.8 FL (ref 80–94)
MONOCYTES # BLD: 1.2 K/UL (ref 0–0.8)
MONOCYTES NFR BLD: 7.6 % (ref 0–10)
NEUTROPHILS NFR BLD AUTO: 84 % (ref 50–75)
NRBC BLD AUTO-RTO: 0 % (ref 0–2)
PLATELET # BLD: 185 K/UL (ref 130–400)
PMV BLD AUTO: 9 FL (ref 7.2–11.7)
POTASSIUM SERPL-SCNC: 3.6 MMOL/L (ref 3.6–5.2)
PROT SERPL-MCNC: 6.2 G/DL (ref 6.3–8.3)
SODIUM SERPL-SCNC: 134 MMOL/L (ref 132–148)
TOTAL CELLS COUNTED BLD: 100
WBC # BLD AUTO: 16.1 K/UL (ref 4.8–10.8)

## 2017-11-26 RX ADMIN — DEXTROSE AND SODIUM CHLORIDE SCH: 5; 900 INJECTION, SOLUTION INTRAVENOUS at 14:47

## 2017-11-26 RX ADMIN — CIPROFLOXACIN SCH MLS/HR: 2 INJECTION, SOLUTION INTRAVENOUS at 17:40

## 2017-11-26 RX ADMIN — DEXTROSE AND SODIUM CHLORIDE SCH MLS/HR: 5; 900 INJECTION, SOLUTION INTRAVENOUS at 04:58

## 2017-11-26 RX ADMIN — WATER SCH MLS/HR: 1 INJECTION INTRAMUSCULAR; INTRAVENOUS; SUBCUTANEOUS at 00:01

## 2017-11-26 RX ADMIN — WATER SCH MLS/HR: 1 INJECTION INTRAMUSCULAR; INTRAVENOUS; SUBCUTANEOUS at 17:40

## 2017-11-26 RX ADMIN — PANTOPRAZOLE SODIUM SCH MG: 40 TABLET, DELAYED RELEASE ORAL at 09:24

## 2017-11-26 RX ADMIN — ENOXAPARIN SODIUM SCH MG: 40 INJECTION SUBCUTANEOUS at 09:24

## 2017-11-26 RX ADMIN — CIPROFLOXACIN SCH MLS/HR: 2 INJECTION, SOLUTION INTRAVENOUS at 04:57

## 2017-11-26 RX ADMIN — DEXTROSE AND SODIUM CHLORIDE SCH MLS/HR: 5; 900 INJECTION, SOLUTION INTRAVENOUS at 21:01

## 2017-11-26 RX ADMIN — WATER SCH MLS/HR: 1 INJECTION INTRAMUSCULAR; INTRAVENOUS; SUBCUTANEOUS at 08:39

## 2017-11-26 NOTE — PN
DAILY PROGRESS NOTE



DATE:  11/25/2017



SUBJECTIVE:  The patient is seen today 11/25/2017.  He stated that he is

not nauseous and he was given liquid diet, without any nausea or vomiting.



PHYSICAL EXAMINATION:

VITAL SIGNS:  Blood pressure is 146/72, temperature 99.5, respiratory rate

20 and pulse 99.

HEENT:  Pupils equal, reactive to light.  Normal-appearing mucosa of the

conjunctivae, oropharyngeal and nasal membrane mucosa.

NECK:  Supple.  No JVD.  No carotid bruit.  No lymph node.  No thyromegaly.

CHEST AND LUNGS:  Bilateral symmetrical expansion.  Good air exchange.  No

rales, no rhonchi.

CARDIOVASCULAR SYSTEM:  PMI not localized.  S1 and S2.  No additional

sounds.

ABDOMEN:  Slightly distended, but good bowel sounds.  No tenderness.  No

organomegaly.  No masses.

EXTREMITIES:  No cyanosis, no clubbing.  No edema.

CENTRAL NERVOUS SYSTEMS:  Alert, awake, oriented x2.  No neurological

deficit could be appreciated.



ASSESSMENT:

1.  Possible small bowel obstruction with leukocytosis.

2.  Hypertension.

3.  Hypercholesterolemia.



PLAN:  Will follow surgical recommendations.  Advance diet as tolerated. 

Continue current IV antibiotics and repeat blood work in the morning.







__________________________________________

Gem Lewis MD





DD:  11/25/2017 23:05:58

DT:  11/25/2017 23:07:10

Job # [id number]

## 2017-11-26 NOTE — CP.PCM.PN
Subjective





- Date & Time of Evaluation


Date of Evaluation: 11/26/17


Time of Evaluation: 06:15





- Subjective


Subjective: 





General Surgery- Dr. Rosenbaum





Patient seen and examined at bedside this AM. NAEO. tolerating diet, passing 

flatus. pain localized in RUQ well controlled on current pain regiment. Denies F

/C CP/SOB N/V/D





Objective





- Vital Signs/Intake and Output


Vital Signs (last 24 hours): 


 











Temp Pulse Resp BP Pulse Ox


 


 98.7 F   91 H  20   145/71   97 


 


 11/26/17 09:57  11/26/17 09:57  11/26/17 09:57  11/26/17 09:57  11/26/17 09:57








Intake and Output: 


 











 11/26/17 11/26/17





 06:59 18:59


 


Intake Total 1000 


 


Output Total 150 


 


Balance 850 














- Medications


Medications: 


 Current Medications





Acetaminophen (Tylenol 325mg Tab)  650 mg PO Q6 PRN


   PRN Reason: Temperature


   Last Admin: 11/25/17 18:51 Dose:  650 mg


Amlodipine Besylate (Norvasc)  5 mg PO DAILY [facility]


   Last Admin: 11/26/17 09:24 Dose:  5 mg


Aspirin (Ecotrin)  81 mg PO DAILY [facility]


   Last Admin: 11/26/17 09:24 Dose:  81 mg


Docusate Sodium (Colace)  100 mg PO BID [facility]


   Last Admin: 11/26/17 09:24 Dose:  100 mg


Enoxaparin Sodium (Lovenox)  40 mg SC DAILY [facility]


   Last Admin: 11/26/17 09:24 Dose:  40 mg


Fenofibrate (Tricor)  145 mg PO DAILY [facility]


   Last Admin: 11/26/17 09:24 Dose:  145 mg


Dextrose/Sodium Chloride (Dextrose 5%/0.9% Ns 1000 Ml)  1,000 mls @ 100 mls/hr 

IV .Q10H [facility]


   Last Admin: 11/26/17 04:58 Dose:  100 mls/hr


Ciprofloxacin (Cipro 400mg/200ml Dsw)  400 mg in 200 mls @ 133 mls/hr IVPB Q12H 

[facility]


   Last Admin: 11/26/17 04:57 Dose:  133 mls/hr


Metronidazole (Flagyl)  500 mg in 100 mls @ 100 mls/hr IVPB Q8H [facility]


   Last Admin: 11/26/17 08:39 Dose:  100 mls/hr


Ondansetron HCl (Zofran Tab)  4 mg PO Q8H PRN


   PRN Reason: Nausea/Vomiting


   Last Admin: 11/25/17 17:18 Dose:  4 mg


Pantoprazole Sodium (Protonix Ec Tab)  40 mg PO DAILY [facility]


   Last Admin: 11/26/17 09:24 Dose:  40 mg


Rosuvastatin Calcium (Crestor)  10 mg PO HS [facility]


   Last Admin: 11/25/17 21:44 Dose:  10 mg











- Labs


Labs: 


 





 11/26/17 08:49 





 11/26/17 08:49 





 











PT  14.4 SECONDS (9.7-12.2)  H  11/24/17  11:15    


 


INR  1.3   11/24/17  11:15    


 


APTT  32 SECONDS (21-34)   11/24/17  11:15    














- Constitutional


Appears: Non-toxic, No Acute Distress





- Head Exam


Head Exam: ATRAUMATIC





- Eye Exam


Eye Exam: EOMI





- ENT Exam


ENT Exam: Mucous Membranes Moist





- Respiratory Exam


Respiratory Exam: NORMAL BREATHING PATTERN.  absent: Accessory Muscle Use, 

Respiratory Distress





- Cardiovascular Exam


Cardiovascular Exam: +S1, +S2.  absent: Bradycardia, Tachycardia





- GI/Abdominal Exam


GI & Abdominal Exam: Soft, Tenderness.  absent: Distended, Firm, Guarding, Rigid

, Rebound


Additional comments: 





tender to palpation in right upper quadrant





- Extremities Exam


Extremities Exam: Normal Inspection.  absent: Calf Tenderness





- Neurological Exam


Neurological Exam: Alert, Awake, Oriented x3





- Skin


Skin Exam: Intact, Warm





Assessment and Plan





- Assessment and Plan (Free Text)


Assessment: 





82M RUQ pain; cholelithiasis vs cholecystitis


Plan: 





- HIDA scan pending


- continue CLD


- will make NPO at midnight


- anti-emetic and pain control PRN


- IVR


- GI/DVT ppx


- d/w Dr. Ilsa Olivares PGY1

## 2017-11-26 NOTE — CP.PCM.PN
Subjective





- Date & Time of Evaluation


Date of Evaluation: 11/26/17


Time of Evaluation: 14:55





- Subjective


Subjective: 





awaiting hida scan 





Objective





- Vital Signs/Intake and Output


Vital Signs (last 24 hours): 


 











Temp Pulse Resp BP Pulse Ox


 


 98.7 F   91 H  20   145/71   97 


 


 11/26/17 09:57  11/26/17 09:57  11/26/17 09:57  11/26/17 09:57  11/26/17 09:57








Intake and Output: 


 











 11/26/17 11/26/17





 06:59 18:59


 


Intake Total 1000 1050


 


Output Total 150 600


 


Balance 850 450














- Medications


Medications: 


 Current Medications





Acetaminophen (Tylenol 325mg Tab)  650 mg PO Q6 PRN


   PRN Reason: Temperature


   Last Admin: 11/25/17 18:51 Dose:  650 mg


Amlodipine Besylate (Norvasc)  5 mg PO DAILY [facility]


   Last Admin: 11/26/17 09:24 Dose:  5 mg


Aspirin (Ecotrin)  81 mg PO DAILY [facility]


   Last Admin: 11/26/17 09:24 Dose:  81 mg


Docusate Sodium (Colace)  100 mg PO BID [facility]


   Last Admin: 11/26/17 09:24 Dose:  100 mg


Enoxaparin Sodium (Lovenox)  40 mg SC DAILY [facility]


   Last Admin: 11/26/17 09:24 Dose:  40 mg


Fenofibrate (Tricor)  145 mg PO DAILY [facility]


   Last Admin: 11/26/17 09:24 Dose:  145 mg


Dextrose/Sodium Chloride (Dextrose 5%/0.9% Ns 1000 Ml)  1,000 mls @ 100 mls/hr 

IV .Q10H [facility]


   Last Admin: 11/26/17 14:47 Dose:  Not Given


Ciprofloxacin (Cipro 400mg/200ml Dsw)  400 mg in 200 mls @ 133 mls/hr IVPB Q12H 

[facility]


   Last Admin: 11/26/17 04:57 Dose:  133 mls/hr


Metronidazole (Flagyl)  500 mg in 100 mls @ 100 mls/hr IVPB Q8H [facility]


   Last Admin: 11/26/17 08:39 Dose:  100 mls/hr


Ondansetron HCl (Zofran Tab)  4 mg PO Q8H PRN


   PRN Reason: Nausea/Vomiting


   Last Admin: 11/25/17 17:18 Dose:  4 mg


Pantoprazole Sodium (Protonix Ec Tab)  40 mg PO DAILY [facility]


   Last Admin: 11/26/17 09:24 Dose:  40 mg


Rosuvastatin Calcium (Crestor)  10 mg PO HS [facility]


   Last Admin: 11/25/17 21:44 Dose:  10 mg











- Labs


Labs: 


 





 11/26/17 08:49 





 11/26/17 08:49 





 











PT  14.4 SECONDS (9.7-12.2)  H  11/24/17  11:15    


 


INR  1.3   11/24/17  11:15    


 


APTT  32 SECONDS (21-34)   11/24/17  11:15

## 2017-11-26 NOTE — HP
LATE ENTRY FOR HISTORY AND PHYSICAL



HISTORY OF PRESENT ILLNESS:  The patient was seen on 11/24/2017.  He is an

82-year-old  male with history of multiple medical problems

including multiple admissions for small bowel obstruction.  Patient

presented to the emergency room with symptoms of abdominal pain and

vomiting.  Patient was evaluated in the emergency room and he was found to

have;



1.  Diverticulosis without CT evidence of diverticulitis, post-surgical

changes of sigmoid colon with collapsed bowel, distal to the anastomosis;

stricture not entirely excluded.

2.  Cholelithiasis.



Patient was complaining of these symptoms for 1 day.  Patient was admitted

for further management after he was started on IV fluid and was kept n.p.o.

Surgical consult also was called by emergency room doctor.



REVIEW OF SYSTEMS:  Other review of systems is negative.



ALLERGIES:  NO KNOWN ALLERGIES.



HOME MEDICATIONS:  Norvasc 5 mg daily, Crestor 10 mg daily, Protonix 40 mg

daily, fenofibrate 200 mg daily, aspirin 81 mg daily.



SOCIAL HISTORY:  No history of smoking, EtOH or substance abuse.



FAMILY HISTORY:  Noncontributory.



ASSESSMENT:

1.  Hypercholesterolemia.

2.  Gastritis/gastroesophageal reflux disease.

3.  Hypertension.

4.  Status post partial colectomy.

5.  Small bowel obstruction symptoms that have been happened

intermittently.



PLAN:  Follow surgical recommendations.  We will give the patient Cipro and

Flagyl IV due to the leukocytosis.  We will repeat blood work.  Resume

patient's home medications.





__________________________________________

Gem Lewis MD

DD:  11/25/2017 23:03:34

DT:  11/26/2017 3:21:45

Job # [id number]

## 2017-11-27 RX ADMIN — CIPROFLOXACIN SCH MLS/HR: 2 INJECTION, SOLUTION INTRAVENOUS at 17:38

## 2017-11-27 RX ADMIN — WATER SCH MLS/HR: 1 INJECTION INTRAMUSCULAR; INTRAVENOUS; SUBCUTANEOUS at 17:37

## 2017-11-27 RX ADMIN — DEXTROSE AND SODIUM CHLORIDE SCH: 5; 900 INJECTION, SOLUTION INTRAVENOUS at 00:00

## 2017-11-27 RX ADMIN — WATER SCH MLS/HR: 1 INJECTION INTRAMUSCULAR; INTRAVENOUS; SUBCUTANEOUS at 10:50

## 2017-11-27 RX ADMIN — PANTOPRAZOLE SODIUM SCH MG: 40 TABLET, DELAYED RELEASE ORAL at 12:31

## 2017-11-27 RX ADMIN — ENOXAPARIN SODIUM SCH MG: 40 INJECTION SUBCUTANEOUS at 10:49

## 2017-11-27 RX ADMIN — CIPROFLOXACIN SCH MLS/HR: 2 INJECTION, SOLUTION INTRAVENOUS at 04:45

## 2017-11-27 RX ADMIN — WATER SCH MLS/HR: 1 INJECTION INTRAMUSCULAR; INTRAVENOUS; SUBCUTANEOUS at 00:35

## 2017-11-27 NOTE — CP.PCM.PN
Subjective





- Date & Time of Evaluation


Date of Evaluation: 11/27/17


Time of Evaluation: 12:05





- Subjective


Subjective: 





Surgery: Dr. Rosenbaum





Pt seen and examined. Resting comfortably in bed. No complaints of pain. No N/

V. No F/C.





Objective





- Vital Signs/Intake and Output


Vital Signs (last 24 hours): 


 











Temp Pulse Resp BP Pulse Ox


 


 98.5 F   86   20   146/57 L  96 


 


 11/27/17 08:25  11/27/17 08:25  11/27/17 08:25  11/27/17 08:25  11/27/17 08:25








Intake and Output: 


 











 11/27/17 11/27/17





 06:59 18:59


 


Intake Total  900


 


Output Total 850 40


 


Balance -850 860














- Medications


Medications: 


 Current Medications





Acetaminophen (Tylenol 325mg Tab)  650 mg PO Q6 PRN


   PRN Reason: Temperature


   Last Admin: 11/25/17 18:51 Dose:  650 mg


Amlodipine Besylate (Norvasc)  5 mg PO DAILY [facility]


   Last Admin: 11/26/17 09:24 Dose:  5 mg


Aspirin (Ecotrin)  81 mg PO DAILY [facility]


   Last Admin: 11/26/17 09:24 Dose:  81 mg


Docusate Sodium (Colace)  100 mg PO BID [facility]


   Last Admin: 11/27/17 10:56 Dose:  Not Given


Enoxaparin Sodium (Lovenox)  40 mg SC DAILY [facility]


   Last Admin: 11/27/17 10:49 Dose:  40 mg


Fenofibrate (Tricor)  145 mg PO DAILY [facility]


   Last Admin: 11/26/17 09:24 Dose:  145 mg


Ciprofloxacin (Cipro 400mg/200ml Dsw)  400 mg in 200 mls @ 133 mls/hr IVPB Q12H 

[facility]


   Last Admin: 11/27/17 04:45 Dose:  133 mls/hr


Metronidazole (Flagyl)  500 mg in 100 mls @ 100 mls/hr IVPB Q8H [facility]


   Last Admin: 11/27/17 10:50 Dose:  100 mls/hr


Ondansetron HCl (Zofran Tab)  4 mg PO Q8H PRN


   PRN Reason: Nausea/Vomiting


   Last Admin: 11/25/17 17:18 Dose:  4 mg


Pantoprazole Sodium (Protonix Ec Tab)  40 mg PO DAILY [facility]


   Last Admin: 11/26/17 09:24 Dose:  40 mg


Rosuvastatin Calcium (Crestor)  10 mg PO HS [facility]


   Last Admin: 11/26/17 22:03 Dose:  10 mg











- Labs


Labs: 


 





 11/26/17 08:49 





 11/26/17 08:49 





 











PT  14.4 SECONDS (9.7-12.2)  H  11/24/17  11:15    


 


INR  1.3   11/24/17  11:15    


 


APTT  32 SECONDS (21-34)   11/24/17  11:15    














- Constitutional


Appears: Non-toxic, No Acute Distress





- Head Exam


Head Exam: ATRAUMATIC, NORMOCEPHALIC





- Eye Exam


Eye Exam: EOMI





- ENT Exam


ENT Exam: Mucous Membranes Moist





- Neck Exam


Neck Exam: Full ROM





- Respiratory Exam


Respiratory Exam: NORMAL BREATHING PATTERN.  absent: Accessory Muscle Use, 

Respiratory Distress





- GI/Abdominal Exam


GI & Abdominal Exam: Soft.  absent: Distended, Firm, Guarding, Rigid, Tenderness

, Rebound





- Extremities Exam


Extremities Exam: absent: Calf Tenderness, Pedal Edema





- Neurological Exam


Neurological Exam: Alert, Awake, Oriented x3





- Skin


Skin Exam: Dry, Normal Color, Warm





Assessment and Plan





- Assessment and Plan (Free Text)


Assessment: 





82M w. cholecystitis


-HIDA scan: official read pending, appears to be positive


-c/w abx


-c/w pain meds


-will plan for OR tomorrow


-NPO at midnight


-d/w attending





[employer] PGY3

## 2017-11-27 NOTE — NM
PROCEDURE:  Nuclear Medicine Hepatobiliary Scan



HISTORY:

ABD pain w/ gallstones



COMPARISON:

No prior nuclear HIDA scan available.  Correlation is made with an 

ultrasound 05/20/2017 as well as abdomen and pelvis CT with contrast 

11/23/2017. 



TECHNIQUE:

5.1 mCi of technetium 99m Mebrofenin was administered intravenously. 

Planar images of the abdomen were obtained at 5 min intervals to 60 

mins. Delayed images were also obtained.



FINDINGS:

LIVER: Timely and homogenous uptake.



COMMON BILE DUCT: identified at 5-10 mins.



GALLBLADDER: Not identified up to 3 hours post isotope 

administration. 



SMALL BOWEL: Identified at 10 mins.



IMPRESSION:





1. Findings suggestive of acute cystic duct obstruction. 



2. No nuclear evidence of common bile duct obstruction.

## 2017-11-28 LAB
ALBUMIN/GLOB SERPL: 1.1 {RATIO} (ref 1–2.1)
ALP SERPL-CCNC: 104 U/L (ref 38–126)
ALT SERPL-CCNC: 42 U/L (ref 21–72)
AST SERPL-CCNC: 30 U/L (ref 17–59)
BASOPHILS # BLD AUTO: 0 K/UL (ref 0–0.2)
BASOPHILS NFR BLD: 0.2 % (ref 0–2)
BILIRUB SERPL-MCNC: 0.8 MG/DL (ref 0.2–1.3)
BUN SERPL-MCNC: 8 MG/DL (ref 9–20)
CALCIUM SERPL-MCNC: 7.9 MG/DL (ref 8.6–10.4)
CHLORIDE SERPL-SCNC: 105 MMOL/L (ref 98–107)
CO2 SERPL-SCNC: 20 MMOL/L (ref 22–30)
EOSINOPHIL # BLD AUTO: 0.1 K/UL (ref 0–0.7)
EOSINOPHIL NFR BLD: 1.5 % (ref 0–4)
EOSINOPHIL NFR BLD: 5 % (ref 0–4)
ERYTHROCYTE [DISTWIDTH] IN BLOOD BY AUTOMATED COUNT: 18.8 % (ref 11.5–14.5)
GLOBULIN SER-MCNC: 2.9 GM/DL (ref 2.2–3.9)
GLUCOSE SERPL-MCNC: 112 MG/DL (ref 75–110)
HCT VFR BLD CALC: 30.4 % (ref 35–51)
LYMPHOCYTES # BLD AUTO: 0.7 K/UL (ref 1–4.3)
LYMPHOCYTES NFR BLD AUTO: 8.3 % (ref 20–40)
MCH RBC QN AUTO: 23.9 PG (ref 27–31)
MCHC RBC AUTO-ENTMCNC: 33.3 G/DL (ref 33–37)
MCV RBC AUTO: 71.8 FL (ref 80–94)
MONOCYTES # BLD: 1 K/UL (ref 0–0.8)
MONOCYTES NFR BLD: 11 % (ref 0–10)
NEUTROPHILS NFR BLD AUTO: 76 % (ref 50–75)
NRBC BLD AUTO-RTO: 0 % (ref 0–2)
PLATELET # BLD: 229 K/UL (ref 130–400)
PMV BLD AUTO: 8.7 FL (ref 7.2–11.7)
POTASSIUM SERPL-SCNC: 3.2 MMOL/L (ref 3.6–5.2)
PROT SERPL-MCNC: 6.2 G/DL (ref 6.3–8.3)
SODIUM SERPL-SCNC: 134 MMOL/L (ref 132–148)
TOTAL CELLS COUNTED BLD: 100
WBC # BLD AUTO: 8.9 K/UL (ref 4.8–10.8)

## 2017-11-28 PROCEDURE — 0FT40ZZ RESECTION OF GALLBLADDER, OPEN APPROACH: ICD-10-PCS | Performed by: SURGERY

## 2017-11-28 PROCEDURE — BF03YZZ PLAIN RADIOGRAPHY OF GALLBLADDER AND BILE DUCTS USING OTHER CONTRAST: ICD-10-PCS | Performed by: SURGERY

## 2017-11-28 PROCEDURE — 0FJ44ZZ INSPECTION OF GALLBLADDER, PERCUTANEOUS ENDOSCOPIC APPROACH: ICD-10-PCS | Performed by: SURGERY

## 2017-11-28 RX ADMIN — CIPROFLOXACIN SCH MLS/HR: 2 INJECTION, SOLUTION INTRAVENOUS at 04:46

## 2017-11-28 RX ADMIN — WATER SCH MLS/HR: 1 INJECTION INTRAMUSCULAR; INTRAVENOUS; SUBCUTANEOUS at 17:29

## 2017-11-28 RX ADMIN — PANTOPRAZOLE SODIUM SCH: 40 TABLET, DELAYED RELEASE ORAL at 10:35

## 2017-11-28 RX ADMIN — WATER SCH MLS/HR: 1 INJECTION INTRAMUSCULAR; INTRAVENOUS; SUBCUTANEOUS at 09:06

## 2017-11-28 RX ADMIN — HYDROMORPHONE HYDROCHLORIDE PRN MG: 1 INJECTION, SOLUTION INTRAMUSCULAR; INTRAVENOUS; SUBCUTANEOUS at 18:40

## 2017-11-28 RX ADMIN — HYDROMORPHONE HYDROCHLORIDE PRN MG: 1 INJECTION, SOLUTION INTRAMUSCULAR; INTRAVENOUS; SUBCUTANEOUS at 23:38

## 2017-11-28 RX ADMIN — CIPROFLOXACIN SCH MLS/HR: 2 INJECTION, SOLUTION INTRAVENOUS at 17:29

## 2017-11-28 RX ADMIN — WATER SCH MLS/HR: 1 INJECTION INTRAMUSCULAR; INTRAVENOUS; SUBCUTANEOUS at 01:00

## 2017-11-28 NOTE — PCM.SURG1
Surgeon's Initial Post Op Note





- Surgeon's Notes


Surgeon: Dr. Rosenbaum


Assistant: Dr. Bo PGY3, Marcello OMS3


Type of Anesthesia: General Endo


Pre-Operative Diagnosis: Cholecystitis


Operative Findings: Dense adhesions, bowel stuck to previous mesh, unable to 

visualize gallbladder causing conversion to open. Once open, gangrenous 

gallbadder visualized.


Post-Operative Diagnosis: Gangrenous cholecystitis


Operation Performed: Laparoscopic converted to open cholecystectomy with 

intraoperative cholangiogram.


Specimen/Specimens Removed: Gallbladder with gallstones.


Estimated Blood Loss: EBL {In ML}: 300


Blood Products Given: N/A


Drains Used: Cheko


Post-Op Condition: Fair


Date of Surgery/Procedure: 11/28/17


Time of Surgery/Procedure: 14:53

## 2017-11-28 NOTE — PN
DAILY PROGRESS NOTE



DATE:  11/27/2017



SUBJECTIVE:  The patient is seen today on 11/27/2017.  He is not in any

cardiopulmonary distress.



PHYSICAL EXAMINATION:

VITAL SIGNS:  Blood pressure 146/57, temperature 98.5, respiratory rate 20

and pulse 86.

HEENT:  Pupils equal, reactive to light.  Normal-appearing mucosa of the

conjunctivae, oropharyngeal and nasal membrane mucosa.

NECK:  Supple.  No JVD.  No carotid bruit.  No lymph node.  No thyromegaly.

CHEST AND LUNGS:  Bilateral symmetrical expansion.  Good air exchange.  No

rales.  No rhonchi.

CARDIOVASCULAR SYSTEM:  PMI not localized.  S1 and S2.  No additional

sounds.

ABDOMEN:  Normoactive bowel sounds.  No tenderness.  No organomegaly.  No

masses.

EXTREMITIES:  No cyanosis.  No clubbing.  No edema.

CENTRAL NERVOUS SYSTEM:  Alert, awake, oriented x2.  No neurological

deficit could be appreciated.



LABORATORY DATA:  The hepatobiliary scan today showed obstruction of the

cystic duct suggestive of acute cholecystitis.



ASSESSMENT:

1.  Acute cholecystitis.

2.  Hypertension.

3.  Hypercholesterolemia.



PLAN:  The patient is scheduled for lap cholecystectomy by Dr. Rosenbaum

tomorrow morning.  Continue current medications.





__________________________________________

Gem Lewis MD



cc:



DD:  11/27/2017 19:04:35

DT:  11/27/2017 20:45:49

Job # [id number]

## 2017-11-29 LAB
ALBUMIN/GLOB SERPL: 1.1 {RATIO} (ref 1–2.1)
ALP SERPL-CCNC: 104 U/L (ref 38–126)
ALT SERPL-CCNC: 68 U/L (ref 21–72)
AST SERPL-CCNC: 98 U/L (ref 17–59)
BASOPHILS # BLD AUTO: 0 K/UL (ref 0–0.2)
BASOPHILS NFR BLD: 0.1 % (ref 0–2)
BILIRUB SERPL-MCNC: 0.7 MG/DL (ref 0.2–1.3)
BUN SERPL-MCNC: 12 MG/DL (ref 9–20)
CALCIUM SERPL-MCNC: 7.5 MG/DL (ref 8.6–10.4)
CHLORIDE SERPL-SCNC: 105 MMOL/L (ref 98–107)
CO2 SERPL-SCNC: 15 MMOL/L (ref 22–30)
EOSINOPHIL # BLD AUTO: 0 K/UL (ref 0–0.7)
EOSINOPHIL NFR BLD: 0 % (ref 0–4)
ERYTHROCYTE [DISTWIDTH] IN BLOOD BY AUTOMATED COUNT: 18.4 % (ref 11.5–14.5)
GLOBULIN SER-MCNC: 3 GM/DL (ref 2.2–3.9)
GLUCOSE SERPL-MCNC: 143 MG/DL (ref 75–110)
HCT VFR BLD CALC: 30.7 % (ref 35–51)
LYMPHOCYTES # BLD AUTO: 0.6 K/UL (ref 1–4.3)
LYMPHOCYTES NFR BLD AUTO: 4.1 % (ref 20–40)
MCH RBC QN AUTO: 23.5 PG (ref 27–31)
MCHC RBC AUTO-ENTMCNC: 32.6 G/DL (ref 33–37)
MCV RBC AUTO: 72 FL (ref 80–94)
MONOCYTES # BLD: 1.8 K/UL (ref 0–0.8)
MONOCYTES NFR BLD: 12.8 % (ref 0–10)
NEUTROPHILS NFR BLD AUTO: 83 % (ref 50–75)
NRBC BLD AUTO-RTO: 0.2 % (ref 0–2)
PLATELET # BLD: 298 K/UL (ref 130–400)
PMV BLD AUTO: 8.2 FL (ref 7.2–11.7)
POTASSIUM SERPL-SCNC: 3.8 MMOL/L (ref 3.6–5.2)
PROT SERPL-MCNC: 6.3 G/DL (ref 6.3–8.3)
SODIUM SERPL-SCNC: 134 MMOL/L (ref 132–148)
TOTAL CELLS COUNTED BLD: 100
WBC # BLD AUTO: 14.1 K/UL (ref 4.8–10.8)

## 2017-11-29 RX ADMIN — WATER SCH MLS/HR: 1 INJECTION INTRAMUSCULAR; INTRAVENOUS; SUBCUTANEOUS at 17:51

## 2017-11-29 RX ADMIN — HYDROMORPHONE HYDROCHLORIDE PRN MG: 1 INJECTION, SOLUTION INTRAMUSCULAR; INTRAVENOUS; SUBCUTANEOUS at 03:50

## 2017-11-29 RX ADMIN — HYDROMORPHONE HYDROCHLORIDE PRN MG: 1 INJECTION, SOLUTION INTRAMUSCULAR; INTRAVENOUS; SUBCUTANEOUS at 17:48

## 2017-11-29 RX ADMIN — CIPROFLOXACIN SCH MLS/HR: 2 INJECTION, SOLUTION INTRAVENOUS at 04:09

## 2017-11-29 RX ADMIN — CIPROFLOXACIN SCH MLS/HR: 2 INJECTION, SOLUTION INTRAVENOUS at 17:50

## 2017-11-29 RX ADMIN — HYDROMORPHONE HYDROCHLORIDE PRN MG: 1 INJECTION, SOLUTION INTRAMUSCULAR; INTRAVENOUS; SUBCUTANEOUS at 08:38

## 2017-11-29 RX ADMIN — WATER SCH MLS/HR: 1 INJECTION INTRAMUSCULAR; INTRAVENOUS; SUBCUTANEOUS at 00:22

## 2017-11-29 RX ADMIN — WATER SCH MLS/HR: 1 INJECTION INTRAMUSCULAR; INTRAVENOUS; SUBCUTANEOUS at 08:23

## 2017-11-29 RX ADMIN — ALBUTEROL SULFATE SCH MG: 1.25 SOLUTION RESPIRATORY (INHALATION) at 18:47

## 2017-11-29 RX ADMIN — PANTOPRAZOLE SODIUM SCH MG: 40 TABLET, DELAYED RELEASE ORAL at 09:44

## 2017-11-29 NOTE — PN
DATE:  11/28/2017



SUBJECTIVE:  The patient is seen today 11/28/2017 postoperative, status

post open cholecystectomy with lysis of adhesions.



PHYSICAL EXAMINATION

VITAL SIGNS:  Blood pressure 136/74, temperature 98.0, respiratory rate 20

and pulse 90.

HEENT:  Pupils equal, reactive to light.  Normal-appearing mucosa of the

conjunctivae, oropharynx, and nasal membrane mucosa.

NECK:  Supple.  No JVD.  No carotid bruit.  No lymph node.  No thyromegaly.

CHEST AND LUNGS:  Bilateral symmetrical expansion.  Good air exchange.  No

rales.  No rhonchi.

CARDIOVASCULAR SYSTEM:  PMI not localized.  S1, S2.  No additional sounds.

ABDOMEN:  Normoactive bowel sounds.  No tenderness.  No organomegaly.  No

masses.

EXTREMITIES:  No cyanosis, no clubbing, no edema.

CENTRAL NERVOUS SYSTEM:  Alert, awake, oriented x2.  No neurological

deficit could be appreciated.



ASSESSMENT:

1.  Status post open cholecystectomy.

2.  Status post small bowel obstruction.

3.  Hypertension.

4.  Hypercholesterolemia.



PLAN:  Continue IV fluids, monitor electrolytes, continue current

antibiotics.  Guarded prognosis.  Repeat blood work in the morning.







__________________________________________

Gem Lewis MD





DD:  11/28/2017 22:08:24

DT:  11/29/2017 0:57:00

Job # [id number]

## 2017-11-29 NOTE — OP
PROCEDURE DATE:  11/28/2017



PREOPERATIVE DIAGNOSIS:  Acute cholecystitis.



POSTOPERATIVE DIAGNOSIS:  Gangrenous cholecystitis.



PROCEDURE CARRIED OUT:  Attempted laparoscopic and then open

cholecystectomy with intraoperative cholangiogram.



SURGEON:  Dr. Ilsa Perez.



ASSISTANT:  Dr. Bo, resident.



ANESTHESIOLOGIST:  Hillary Juárez CRNA.



TYPE OF ANESTHESIA:  General anesthesia.



ESTIMATED BLOOD LOSS:  300 mL.



HISTORY:  An 82-year-old man, multiple previous abdominal operations,

primarily the umbilicus descended lower.



OPERATIVE FINDINGS:  Gangrenous  gallbladder, multiple adhesions, we were

despite placing two 5 mm trocars unable to clearly visualize the

gallbladder and there were numerous adhesions which prevented us from doing

this further.  There was a previous mesh repair at the umbilicus.



PROCEDURE:  The patient was given general anesthesia, intravenous

antibiotics.  Venodyne boots were applied.  We attempted using Veress

needle.  We created pneumoperitoneum.  We placed a 5 mm trocar in the left

upper quadrant just off the midline.  We then placed another one in the

right upper quadrant.  Despite doing this, we were unable to visualize

everything satisfactorily, because of this, we converted to an open

operation.  We converted the subcostal incision.  He had very dense

adhesions around the gallbladder.  We carried out a cholangiogram which

showed free-flow into the duodenum, visualization of the hepatic radicals

and no evidence of any stones or strictures.  After this had been done, I

packed the area and controlled the bleeding.  We placed a drain, we

irrigated this out, checked again for hemostasis on two separate occasions.

Both the cystic duct and the cystic artery were clipped, although these

were clipped in tandem together.  We then placed the drain, closed the

abdomen with running sutures of #1 PDS and closed the skin with skin clips.

There was no evidence of any bowel injury.  There was no evidence of any

adhesions immediately in the area which would prevent us from carrying out

the closure which we accomplished.  Operation carried out, open

cholecystectomy with cholangiogram.







__________________________________________

Alfredo Rosenbaum Jr., MD

cc:  Gem Lewis MD



DD:  11/28/2017 14:56:59

DT:  11/28/2017 17:56:30

Job # [id number]

## 2017-11-29 NOTE — CP.PCM.PN
Subjective





- Date & Time of Evaluation


Date of Evaluation: 11/29/17


Time of Evaluation: 13:37





- Subjective


Subjective: 





hct 30


wbc 14 





 findings discussed with patient





stable





Objective





- Vital Signs/Intake and Output


Vital Signs (last 24 hours): 


 











Temp Pulse Resp BP Pulse Ox


 


 98.3 F   93 H  20   136/42 L  95 


 


 11/29/17 08:00  11/29/17 08:00  11/29/17 08:00  11/29/17 08:00  11/29/17 08:00








Intake and Output: 


 











 11/29/17 11/29/17





 06:59 18:59


 


Intake Total 1600 


 


Output Total 630 


 


Balance 970 














- Medications


Medications: 


 Current Medications





Acetaminophen (Tylenol 325mg Tab)  650 mg PO Q6 PRN


   PRN Reason: Temperature


   Last Admin: 11/27/17 20:24 Dose:  650 mg


Amlodipine Besylate (Norvasc)  5 mg PO DAILY [facility]


   Last Admin: 11/29/17 09:44 Dose:  5 mg


Aspirin (Ecotrin)  81 mg PO DAILY [facility]


   Last Admin: 11/29/17 09:44 Dose:  81 mg


Docusate Sodium (Colace)  100 mg PO BID [facility]


   Last Admin: 11/29/17 09:44 Dose:  100 mg


Fenofibrate (Tricor)  145 mg PO DAILY [facility]


   Last Admin: 11/29/17 09:44 Dose:  145 mg


Hydromorphone HCl (Dilaudid)  0.5 mg IVP Q4H PRN


   PRN Reason: Pain, moderate (4-7)


   Last Admin: 11/29/17 08:38 Dose:  0.5 mg


Ciprofloxacin (Cipro 400mg/200ml Dsw)  400 mg in 200 mls @ 133 mls/hr IVPB Q12H 

[facility]


   Last Admin: 11/29/17 04:09 Dose:  133 mls/hr


Metronidazole (Flagyl)  500 mg in 100 mls @ 100 mls/hr IVPB Q8H [facility]


   Last Admin: 11/29/17 08:23 Dose:  100 mls/hr


Potassium Chloride 40 meq/ (Sodium Chloride)  1,020 mls @ 125 mls/hr IV .Q8H10M 

[facility]


   Last Admin: 11/29/17 03:53 Dose:  125 mls/hr


Ondansetron HCl (Zofran Tab)  4 mg PO Q6 PRN


   PRN Reason: Nausea/Vomiting


Pantoprazole Sodium (Protonix Ec Tab)  40 mg PO DAILY [facility]


   Last Admin: 11/29/17 09:44 Dose:  40 mg


Rosuvastatin Calcium (Crestor)  10 mg PO HS [facility]


   Last Admin: 11/28/17 21:41 Dose:  Not Given











- Labs


Labs: 


 





 11/29/17 07:12 





 11/29/17 07:12 





 











PT  14.4 SECONDS (9.7-12.2)  H  11/24/17  11:15    


 


INR  1.3   11/24/17  11:15    


 


APTT  32 SECONDS (21-34)   11/24/17  11:15

## 2017-11-29 NOTE — RAD
PROCEDURE:  Intraoperative fluoroscopy



HISTORY:

SYMPTOMATIC CHOLEITHIASIS



COMPARISON:

Not available



TECHNIQUE:

Intraoperative fluoroscopy was provided for performance of an 

intraoperative cholangiogram. Total time of fluoroscopy was 30.1 

seconds.



FINDINGS:

Multiple fluoroscopic spot films are submitted.  Films are on file 

for review.



IMPRESSION:

Fluoroscopy provided.

## 2017-11-30 LAB
BASOPHILS # BLD AUTO: 0 K/UL (ref 0–0.2)
BASOPHILS NFR BLD: 0.3 % (ref 0–2)
BUN SERPL-MCNC: 14 MG/DL (ref 9–20)
CALCIUM SERPL-MCNC: 7.6 MG/DL (ref 8.6–10.4)
CHLORIDE SERPL-SCNC: 104 MMOL/L (ref 98–107)
CO2 SERPL-SCNC: 20 MMOL/L (ref 22–30)
EOSINOPHIL # BLD AUTO: 0 K/UL (ref 0–0.7)
EOSINOPHIL NFR BLD: 0.1 % (ref 0–4)
ERYTHROCYTE [DISTWIDTH] IN BLOOD BY AUTOMATED COUNT: 18.9 % (ref 11.5–14.5)
GLUCOSE SERPL-MCNC: 126 MG/DL (ref 75–110)
HCT VFR BLD CALC: 26.3 % (ref 35–51)
LYMPHOCYTES # BLD AUTO: 0.9 K/UL (ref 1–4.3)
LYMPHOCYTES NFR BLD AUTO: 6.1 % (ref 20–40)
MCH RBC QN AUTO: 23.8 PG (ref 27–31)
MCHC RBC AUTO-ENTMCNC: 33.1 G/DL (ref 33–37)
MCV RBC AUTO: 71.9 FL (ref 80–94)
METAMYELOCYTES NFR BLD: 1 % (ref 0–0)
MONOCYTES # BLD: 1.9 K/UL (ref 0–0.8)
MONOCYTES NFR BLD: 12.5 % (ref 0–10)
NEUTROPHILS NFR BLD AUTO: 72 % (ref 50–75)
NRBC BLD AUTO-RTO: 0 % (ref 0–2)
PLATELET # BLD: 289 K/UL (ref 130–400)
PMV BLD AUTO: 8.1 FL (ref 7.2–11.7)
POTASSIUM SERPL-SCNC: 3.7 MMOL/L (ref 3.6–5.2)
SODIUM SERPL-SCNC: 136 MMOL/L (ref 132–148)
TOTAL CELLS COUNTED BLD: 100
WBC # BLD AUTO: 15 K/UL (ref 4.8–10.8)

## 2017-11-30 RX ADMIN — ALBUTEROL SULFATE SCH: 1.25 SOLUTION RESPIRATORY (INHALATION) at 01:04

## 2017-11-30 RX ADMIN — CIPROFLOXACIN SCH MLS/HR: 2 INJECTION, SOLUTION INTRAVENOUS at 05:36

## 2017-11-30 RX ADMIN — ALBUTEROL SULFATE SCH MG: 1.25 SOLUTION RESPIRATORY (INHALATION) at 19:31

## 2017-11-30 RX ADMIN — PANTOPRAZOLE SODIUM SCH MG: 40 TABLET, DELAYED RELEASE ORAL at 09:26

## 2017-11-30 RX ADMIN — ALBUTEROL SULFATE SCH MG: 1.25 SOLUTION RESPIRATORY (INHALATION) at 00:20

## 2017-11-30 RX ADMIN — ALBUTEROL SULFATE SCH MG: 1.25 SOLUTION RESPIRATORY (INHALATION) at 13:23

## 2017-11-30 RX ADMIN — WATER SCH MLS/HR: 1 INJECTION INTRAMUSCULAR; INTRAVENOUS; SUBCUTANEOUS at 09:27

## 2017-11-30 RX ADMIN — WATER SCH MLS/HR: 1 INJECTION INTRAMUSCULAR; INTRAVENOUS; SUBCUTANEOUS at 00:36

## 2017-11-30 RX ADMIN — CIPROFLOXACIN SCH MLS/HR: 2 INJECTION, SOLUTION INTRAVENOUS at 17:39

## 2017-11-30 RX ADMIN — ALBUTEROL SULFATE SCH MG: 1.25 SOLUTION RESPIRATORY (INHALATION) at 08:30

## 2017-11-30 RX ADMIN — HYDROMORPHONE HYDROCHLORIDE PRN MG: 1 INJECTION, SOLUTION INTRAMUSCULAR; INTRAVENOUS; SUBCUTANEOUS at 05:41

## 2017-11-30 RX ADMIN — WATER SCH MLS/HR: 1 INJECTION INTRAMUSCULAR; INTRAVENOUS; SUBCUTANEOUS at 17:38

## 2017-11-30 NOTE — CP.PCM.PN
Subjective





- Date & Time of Evaluation


Date of Evaluation: 11/30/17


Time of Evaluation: 06:30





- Subjective


Subjective: 


SURGERY PROGRESS NOTE FOR DR. PATIÑO





Patient seen and examined at bedside. Pain is well controlled. He denies any 

problems with full liquid diet. Denies nausea or vomiting. He is passing flatus 

but denies BM after surgery. He states that he is urinating a lot. Had PT last 

on 11/28. 





Objective





- Vital Signs/Intake and Output


Vital Signs (last 24 hours): 


 











Temp Pulse Resp BP Pulse Ox


 


 98.3 F   69   20   142/71   94 L


 


 11/30/17 07:05  11/30/17 07:05  11/30/17 07:05  11/30/17 07:05  11/30/17 07:05








Intake and Output: 


 











 11/30/17 11/30/17





 06:59 18:59


 


Intake Total 900 240


 


Output Total 365 300


 


Balance 535 -60














- Medications


Medications: 


 Current Medications





Acetaminophen (Tylenol 325mg Tab)  650 mg PO Q6 PRN


   PRN Reason: Temperature


   Last Admin: 11/27/17 20:24 Dose:  650 mg


Albuterol Sulfate (Albuterol 0.042% Inhal Sol (1.25mg/3ml) Ud)  1.25 mg INH RQ6 

[facility]


   Last Admin: 11/30/17 13:23 Dose:  1.25 mg


Amlodipine Besylate (Norvasc)  5 mg PO DAILY [facility]


   Last Admin: 11/30/17 09:26 Dose:  5 mg


Aspirin (Ecotrin)  81 mg PO DAILY [facility]


   Last Admin: 11/30/17 09:26 Dose:  81 mg


Docusate Sodium (Colace)  100 mg PO BID [facility]


   Last Admin: 11/30/17 09:26 Dose:  100 mg


Fenofibrate (Tricor)  145 mg PO DAILY [facility]


   Last Admin: 11/30/17 09:26 Dose:  145 mg


Heparin Sodium (Porcine) (Heparin)  5,000 units SC Q8 [facility]


   Last Admin: 11/30/17 14:54 Dose:  5,000 units


Hydromorphone HCl (Dilaudid)  0.5 mg IVP Q4H PRN


   PRN Reason: Pain, moderate (4-7)


   Last Admin: 11/30/17 05:41 Dose:  0.5 mg


Ciprofloxacin (Cipro 400mg/200ml Dsw)  400 mg in 200 mls @ 133 mls/hr IVPB Q12H 

[facility]


   Last Admin: 11/30/17 05:36 Dose:  133 mls/hr


Metronidazole (Flagyl)  500 mg in 100 mls @ 100 mls/hr IVPB Q8H [facility]


   Last Admin: 11/30/17 09:27 Dose:  100 mls/hr


Ondansetron HCl (Zofran Tab)  4 mg PO Q6 PRN


   PRN Reason: Nausea/Vomiting


Pantoprazole Sodium (Protonix Ec Tab)  40 mg PO DAILY [facility]


   Last Admin: 11/30/17 09:26 Dose:  40 mg


Rosuvastatin Calcium (Crestor)  10 mg PO HS [facility]


   Last Admin: 11/29/17 21:23 Dose:  10 mg











- Labs


Labs: 


 





 11/30/17 08:04 





 11/30/17 08:04 





 











PT  14.4 SECONDS (9.7-12.2)  H  11/24/17  11:15    


 


INR  1.3   11/24/17  11:15    


 


APTT  32 SECONDS (21-34)   11/24/17  11:15    














- Constitutional


Appears: Well, Non-toxic, No Acute Distress





- Head Exam


Head Exam: ATRAUMATIC, NORMAL INSPECTION





- Respiratory Exam


Respiratory Exam: NORMAL BREATHING PATTERN.  absent: Respiratory Distress





- Cardiovascular Exam


Cardiovascular Exam: +S1, +S2





- GI/Abdominal Exam


GI & Abdominal Exam: Soft.  absent: Distended, Firm, Guarding, Rigid, Tenderness

, Rebound


Additional comments: 


Dressing clean/dry/intact


Cheko drain with 35cc output over past 24 hours





- Neurological Exam


Neurological Exam: Alert, Awake, Oriented x3





- Psychiatric Exam


Psychiatric exam: Normal Affect, Normal Mood





- Skin


Skin Exam: Dry, Normal Color, Warm





Assessment and Plan





- Assessment and Plan (Free Text)


Assessment: 


83yo M with acute gangrenous cholecystitis s/p laparoscopic converted to open 

cholecystectomy with IOC POD#2





- Afebrile, mild tachycardic overnight, now normal HR


- Hgb 8.7 today (10.0 yesterday) - will recheck labs in AM


- Continue IV Abx


- Advanced to heart healthy diet


- Encouraged ambulation and IS use


- Possible DC drain tomorrow


- Discussed plan with Dr. Ilsa Bo PGY-3

## 2017-11-30 NOTE — PN
DATE:  11/29/2017



SUBJECTIVE:  The patient is seen today, 11/29/2017, postoperative day #1.



PHYSICAL EXAMINATION

VITAL SIGNS:  Blood pressure 163/81, temperature 98.5, respiratory rate 20,

and pulse 108.

HEENT:  Pupils are equal and reactive to light.  Normal-appearing mucosa of

the conjunctivae, oropharynx, and nasal membrane mucosa.

NECK:  Supple.  No JVD, no carotid bruit, no lymph node, and no

thyromegaly.

CHEST AND LUNGS:  Bilaterally symmetrical expansion.  Good air exchange. 

No rales.  No rhonchi.

CARDIOVASCULAR SYSTEM:  PMI is not localized.  S1, S2.  No additional

sounds.

ABDOMEN:  Normoactive bowel sounds.  No tenderness.  No organomegaly.  No

masses.

EXTREMITIES:  No cyanosis.  No clubbing.  No edema.

CENTRAL NERVOUS SYSTEM:  Alert, awake, and oriented x2.  No neurological

deficits could be appreciated.



ASSESSMENT:

1.  Postoperative day #1, status post cholecystectomy.

2.  Status post small bowel obstruction.

3.  Hypertension.

4.  Hypercholesterolemia.



PLAN:  Advance diet as tolerated as per Surgery.  Physical therapy.  We

will discontinue IV fluid and give the patient nebulizer treatment due to

shortness of breath and wheezing.





__________________________________________

Gem Lewis MD



DD:  11/29/2017 23:44:46

DT:  11/30/2017 0:30:04

Job # [id number]

## 2017-12-01 LAB
ALBUMIN/GLOB SERPL: 0.8 {RATIO} (ref 1–2.1)
ALP SERPL-CCNC: 96 U/L (ref 38–126)
ALT SERPL-CCNC: 65 U/L (ref 21–72)
AST SERPL-CCNC: 56 U/L (ref 17–59)
BASOPHILS # BLD AUTO: 0.1 K/UL (ref 0–0.2)
BASOPHILS NFR BLD: 0.5 % (ref 0–2)
BILIRUB SERPL-MCNC: 0.4 MG/DL (ref 0.2–1.3)
BUN SERPL-MCNC: 13 MG/DL (ref 9–20)
CALCIUM SERPL-MCNC: 7.8 MG/DL (ref 8.6–10.4)
CHLORIDE SERPL-SCNC: 105 MMOL/L (ref 98–107)
CO2 SERPL-SCNC: 23 MMOL/L (ref 22–30)
EOSINOPHIL # BLD AUTO: 0.2 K/UL (ref 0–0.7)
EOSINOPHIL NFR BLD: 1.6 % (ref 0–4)
ERYTHROCYTE [DISTWIDTH] IN BLOOD BY AUTOMATED COUNT: 18.9 % (ref 11.5–14.5)
GLOBULIN SER-MCNC: 3.8 GM/DL (ref 2.2–3.9)
GLUCOSE SERPL-MCNC: 110 MG/DL (ref 75–110)
HCT VFR BLD CALC: 26.3 % (ref 35–51)
LYMPHOCYTES # BLD AUTO: 1.2 K/UL (ref 1–4.3)
LYMPHOCYTES NFR BLD AUTO: 10.2 % (ref 20–40)
MCH RBC QN AUTO: 23.3 PG (ref 27–31)
MCHC RBC AUTO-ENTMCNC: 32.2 G/DL (ref 33–37)
MCV RBC AUTO: 72.3 FL (ref 80–94)
MONOCYTES # BLD: 1.4 K/UL (ref 0–0.8)
MONOCYTES NFR BLD: 12.1 % (ref 0–10)
NRBC BLD AUTO-RTO: 0.1 % (ref 0–2)
PLATELET # BLD: 303 K/UL (ref 130–400)
PMV BLD AUTO: 7.9 FL (ref 7.2–11.7)
POTASSIUM SERPL-SCNC: 3.4 MMOL/L (ref 3.6–5.2)
PROT SERPL-MCNC: 6.8 G/DL (ref 6.3–8.3)
SODIUM SERPL-SCNC: 135 MMOL/L (ref 132–148)
WBC # BLD AUTO: 11.6 K/UL (ref 4.8–10.8)

## 2017-12-01 RX ADMIN — HYDROMORPHONE HYDROCHLORIDE PRN MG: 1 INJECTION, SOLUTION INTRAMUSCULAR; INTRAVENOUS; SUBCUTANEOUS at 13:31

## 2017-12-01 RX ADMIN — WATER SCH MLS/HR: 1 INJECTION INTRAMUSCULAR; INTRAVENOUS; SUBCUTANEOUS at 00:15

## 2017-12-01 RX ADMIN — ALBUTEROL SULFATE SCH MG: 1.25 SOLUTION RESPIRATORY (INHALATION) at 01:18

## 2017-12-01 RX ADMIN — ALBUTEROL SULFATE SCH MG: 1.25 SOLUTION RESPIRATORY (INHALATION) at 07:36

## 2017-12-01 RX ADMIN — ALBUTEROL SULFATE SCH: 1.25 SOLUTION RESPIRATORY (INHALATION) at 13:48

## 2017-12-01 RX ADMIN — HYDROMORPHONE HYDROCHLORIDE PRN MG: 1 INJECTION, SOLUTION INTRAMUSCULAR; INTRAVENOUS; SUBCUTANEOUS at 00:14

## 2017-12-01 RX ADMIN — CIPROFLOXACIN SCH MLS/HR: 2 INJECTION, SOLUTION INTRAVENOUS at 05:21

## 2017-12-01 RX ADMIN — WATER SCH MLS/HR: 1 INJECTION INTRAMUSCULAR; INTRAVENOUS; SUBCUTANEOUS at 17:19

## 2017-12-01 RX ADMIN — TAZOBACTAM SODIUM AND PIPERACILLIN SODIUM SCH MLS/HR: 375; 3 INJECTION, SOLUTION INTRAVENOUS at 18:41

## 2017-12-01 RX ADMIN — ALBUTEROL SULFATE SCH MG: 1.25 SOLUTION RESPIRATORY (INHALATION) at 19:19

## 2017-12-01 RX ADMIN — HYDROMORPHONE HYDROCHLORIDE PRN MG: 1 INJECTION, SOLUTION INTRAMUSCULAR; INTRAVENOUS; SUBCUTANEOUS at 05:47

## 2017-12-01 RX ADMIN — PANTOPRAZOLE SODIUM SCH MG: 40 TABLET, DELAYED RELEASE ORAL at 09:39

## 2017-12-01 RX ADMIN — WATER SCH MLS/HR: 1 INJECTION INTRAMUSCULAR; INTRAVENOUS; SUBCUTANEOUS at 09:00

## 2017-12-01 NOTE — CP.PCM.CON
History of Present Illness





- History of Present Illness


History of Present Illness: 


INFECTIOUS DISEASE CONSULT;


HPI;





82-year-old male with multiple medical problems with history of small bowel CA S

/P


Small bowel resection, asthma, hypertension, hypercholesterolemia who was 

admitted on 11/26/17 with abdominal pains and diagnosed with small bowel 

obstruction.  Pain subsided once he came to the hospital but workup revealed 

with gallbladder sono and HIDA scan that patient has acute cystic duct 

obstruction and cholelithiasis.


Patient was started on IV Cipro and Flagyl by the PMD.





 PATIENT IS S/P Laparoscopic converted to open cholecystectomy with 

intraoperative cholangiogram. 11/28/17.


PATIENT WAS FOUND TO HAVE A GANGRENOUS GALLBLADDER WITH ADHESIONS TO THE 

PREVIOUS MESH AS REPORTED IN THE POSTOPERATIVE REPORT.





TISSUE CULTURES REPORTED TODAY FOR +VE STREP VIRIDANS.


INFECTIOUS DISEASE CONSULTATION THEREFORE REQUESTED BY PMD AS PATIENT WAS FOUND 

TO HAVE ALSO INCREASING LEUKOCYTOSIS POSTOPERATIVELY.





PATIENT WAS STARTED ON IV VANCOMYCIN 1 G DAILY AS NOTED.


PATIENT CONTINUES TO HAVE POSTOPERATIVE PAIN. PATIENT ALSO REPORTS THAT Kamryn-

Khoury WAS REMOVED TODAY.





PMD: Dr. Lewis





PMH: Smal bowel CA, Asthma, HTN, Hypercholesterolemia


Meds: as per EMR


Allergy: Diphenhydramine


PSH: Appendectomy, Endoscopy, Hernia Repair, small bowel resection


FH: unknown


Social: denies tobacco/EtOH/illicit drug use




















Review of Systems





- Constitutional


Constitutional: absent: Chills, Fever





- EENT


Eyes: absent: Blurred Vision


Nose/Mouth/Throat: absent: Mouth Lesions





- Cardiovascular


Cardiovascular: absent: Chest Pain, Dyspnea, Pedal Edema





- Respiratory


Respiratory: absent: Cough, Hemoptysis





- Gastrointestinal


Gastrointestinal: Abdominal Pain (postoperative site.).  absent: Nausea, 

Vomiting





- Genitourinary


Genitourinary: absent: Difficulty Urinating, Dysuria, Freq UTI





- Neurological


Neurological: absent: Headaches





- Hematologic/Lymphatic


Hematologic: As Per HPI.  absent: Easy Bruising





Past Patient History





- Infectious Disease


Hx of Infectious Diseases: None





- Past Medical History & Family History


Past Medical History?: Yes





- Past Social History


Smoking Status: Never Smoked





- CARDIAC


Hx Hypercholesterolemia: Yes


Hx Hypertension: Yes





- PULMONARY


Hx Asthma: Yes


Hx Bronchitis: Yes


Hx Pneumonia: Yes (2011)





- NEUROLOGICAL


Hx Neurological Disorder: No





- HEENT


Hx HEENT Problems: Yes


Other/Comment: Difficulty hearing of R Ear





- RENAL


Hx Chronic Kidney Disease: No





- ENDOCRINE/METABOLIC


Hx Endocrine Disorders: No





- HEMATOLOGICAL/ONCOLOGICAL


Hx Blood Disorders: Yes


Hx Cancer: Yes (colorectal)


Hx Chemotherapy: Yes (2003)





- INTEGUMENTARY


Hx Dermatological Problems: No





- MUSCULOSKELETAL/RHEUMATOLOGICAL


Hx Musculoskeletal Disorders: No


Hx Falls: No





- GASTROINTESTINAL


Hx Gastrointestinal Disorders: Yes


Other/Comment: COLON CANCER





- GENITOURINARY/GYNECOLOGICAL


Hx Genitourinary Disorders: No





- PSYCHIATRIC


Hx Substance Use: No





- SURGICAL HISTORY


Hx Appendectomy: Yes





- ANESTHESIA


Hx Anesthesia: Yes


Hx Anesthesia Reactions: No


Hx Malignant Hyperthermia: No





Meds


Allergies/Adverse Reactions: 


 Allergies











Allergy/AdvReac Type Severity Reaction Status Date / Time


 


diphenhydramine HCl AdvReac   Verified 11/23/17 16:11





[From Benadryl]     














- Medications


Medications: 


 Current Medications





Acetaminophen (Tylenol 325mg Tab)  650 mg PO Q6 PRN


   PRN Reason: Temperature


   Last Admin: 11/27/17 20:24 Dose:  650 mg


Albuterol Sulfate (Albuterol 0.042% Inhal Sol (1.25mg/3ml) Ud)  1.25 mg INH RQ6 

[facility]


   Last Admin: 12/01/17 13:48 Dose:  Not Given


Amlodipine Besylate (Norvasc)  5 mg PO DAILY [facility]


   Last Admin: 12/01/17 09:39 Dose:  5 mg


Aspirin (Ecotrin)  81 mg PO DAILY [facility]


   Last Admin: 12/01/17 09:40 Dose:  81 mg


Docusate Sodium (Colace)  100 mg PO BID [facility]


   Last Admin: 12/01/17 09:39 Dose:  100 mg


Fenofibrate (Tricor)  145 mg PO DAILY [facility]


   Last Admin: 12/01/17 09:39 Dose:  145 mg


Heparin Sodium (Porcine) (Heparin)  5,000 units SC Q8 [facility]


   Last Admin: 12/01/17 13:30 Dose:  5,000 units


Hydromorphone HCl (Dilaudid)  0.5 mg IVP Q4H PRN


   PRN Reason: Pain, moderate (4-7)


   Last Admin: 12/01/17 13:31 Dose:  0.5 mg


Metronidazole (Flagyl)  500 mg in 100 mls @ 100 mls/hr IVPB Q8H [facility]


   Last Admin: 12/01/17 17:19 Dose:  100 mls/hr


Piperacillin Sod/Tazobactam Sod (Zosyn 3.375 Gm Iv Premix)  3.375 gm in 50 mls 

@ 100 mls/hr IVPB Q8H [facility]


Ondansetron HCl (Zofran Tab)  4 mg PO Q6 PRN


   PRN Reason: Nausea/Vomiting


Pantoprazole Sodium (Protonix Ec Tab)  40 mg PO DAILY [facility]


   Last Admin: 12/01/17 09:39 Dose:  40 mg


Rosuvastatin Calcium (Crestor)  10 mg PO HS [facility]


   Last Admin: 11/30/17 21:20 Dose:  10 mg











Physical Exam





- Constitutional


Appears: No Acute Distress





- Head Exam


Head Exam: NORMAL INSPECTION





- Eye Exam


Eye Exam: EOMI, PERRL





- ENT Exam


ENT Exam: Normal Oropharynx





- Neck Exam


Neck exam: Positive for: Normal Inspection





- Respiratory Exam


Respiratory Exam: Clear to Auscultation Bilateral





- GI/Abdominal Exam


GI & Abdominal Exam: Normal Bowel Sounds, Soft, Tenderness (postoperative wound 

site.  Dressing in place which is dry.)





- Extremities Exam


Extremities exam: Positive for: pedal pulses present.  Negative for: calf 

tenderness, pedal edema





- Neurological Exam


Neurological exam: Alert, CN II-XII Intact, Oriented x3, Reflexes Normal





- Psychiatric Exam


Psychiatric exam: Normal Mood





- Skin


Skin Exam: Normal Color, Warm





Results





- Vital Signs


Recent Vital Signs: 


 Last Vital Signs











Temp  98.6 F   12/01/17 15:14


 


Pulse  105 H  12/01/17 15:14


 


Resp  20   12/01/17 15:14


 


BP  117/62   12/01/17 15:14


 


Pulse Ox  94 L  12/01/17 15:14














- Labs


Result Diagrams: 


 12/01/17 06:42





 12/01/17 06:42


Labs: 


 Laboratory Results - last 24 hr











  12/01/17 12/01/17





  06:42 06:42


 


WBC  11.6 H 


 


RBC  3.63 L 


 


Hgb  8.4 L 


 


Hct  26.3 L 


 


MCV  72.3 L 


 


MCH  23.3 L 


 


MCHC  32.2 L 


 


RDW  18.9 H 


 


Plt Count  303 


 


MPV  7.9 


 


Neut % (Auto)  75.6 H 


 


Lymph % (Auto)  10.2 L 


 


Mono % (Auto)  12.1 H 


 


Eos % (Auto)  1.6 


 


Baso % (Auto)  0.5 


 


Neut #  8.8 H 


 


Lymph #  1.2 


 


Mono #  1.4 H 


 


Eos #  0.2 


 


Baso #  0.1 


 


Sodium   135


 


Potassium   3.4 L


 


Chloride   105


 


Carbon Dioxide   23


 


Anion Gap   10


 


BUN   13


 


Creatinine   1.0


 


Est GFR ( Amer)   > 60


 


Est GFR (Non-Af Amer)   > 60


 


Random Glucose   110


 


Calcium   7.8 L


 


Total Bilirubin   0.4


 


AST   56


 


ALT   65


 


Alkaline Phosphatase   96


 


Total Protein   6.8


 


Albumin   3.1 L


 


Globulin   3.8


 


Albumin/Globulin Ratio   0.8 L














- Imaging and Cardiology


  ** Chest x-ray


Status: Report reviewed by me (chest x-ray 11/23/17 no active disease.)





Assessment & Plan


(1) Acute gangrenous cholecystitis


Assessment and Plan: 


Patient Postoperative  # day 3


Tissue cultures positive for Strep Viridans.


DC IV vancomycin.





Start IV Zosyn 3.375 every 8 hourly for broader gram-positive and strep 

coverage.12/1/17.


Continue IV Flagyl 500 mg every 8 hourly. 11/26/17


Status: Acute   





(2) Status post cholecystectomy


Assessment and Plan: 


S/P OPEN CHOLYCYSTECTOMY 11/28/17.


PATIENT FOUND TO HAVE GANGRENOUS GALLBLADDER, AND MANY ADHESIONS WITH PREVIOUS 

MESH INSERTED.


Status: Acute   





(3) Abdominal pain


Assessment and Plan: 


POSTOPERATIVE PAIN.  DRESSING IN PLACE


Status: Acute   





(4) Asthma


Assessment and Plan: 


SPIROMETRY AT BEDSIDE.





Status: Acute

## 2017-12-01 NOTE — CP.PCM.PN
Subjective





- Date & Time of Evaluation


Date of Evaluation: 12/01/17


Time of Evaluation: 15:15





- Subjective


Subjective: 





PLAN DURING ROUNDS TODAY WERE TO D/C PT TO TCU IF ACCEPTED.  UPON REVIEW OF THE 

CHART, PT HAS LEUKOCYTOSIS, IS TACHYCARDIC, AND HAS A POSITIVE CULTURE ( SEE 

MICRO REPORT) FORM THE GALLBLADDER SENT FROM THE OR.  SENSITIVITY REPORT 

REVIEWED AND DISCUSSED ALL FINDINGS WITH TAYE SHEIKH.  DR. ERAZO NOTIFIED FOR ID 

CONSULT. CIPRO D/C'D, VANCO STARTED, CONTINUE FLAGYL, BLOOD CULTURE STAT, AND 

AM LABS ALL ORDERED.  DR. ERAZO TO SEE THE PT DURING HER ROUNDS.  NO FURTHER 

ORDERS.  





Objective





- Vital Signs/Intake and Output


Vital Signs (last 24 hours): 


 











Temp Pulse Resp BP Pulse Ox


 


 97.7 F   100 H  20   153/82 H  97 


 


 12/01/17 08:41  12/01/17 08:41  12/01/17 08:41  12/01/17 08:41  12/01/17 08:41








Intake and Output: 


 











 12/01/17 12/01/17





 06:59 18:59


 


Output Total 205 


 


Balance -205 














- Medications


Medications: 


 Current Medications





Acetaminophen (Tylenol 325mg Tab)  650 mg PO Q6 PRN


   PRN Reason: Temperature


   Last Admin: 11/27/17 20:24 Dose:  650 mg


Albuterol Sulfate (Albuterol 0.042% Inhal Sol (1.25mg/3ml) Ud)  1.25 mg INH RQ6 

[facility]


   Last Admin: 12/01/17 13:48 Dose:  Not Given


Amlodipine Besylate (Norvasc)  5 mg PO DAILY [facility]


   Last Admin: 12/01/17 09:39 Dose:  5 mg


Aspirin (Ecotrin)  81 mg PO DAILY [facility]


   Last Admin: 12/01/17 09:40 Dose:  81 mg


Docusate Sodium (Colace)  100 mg PO BID [facility]


   Last Admin: 12/01/17 09:39 Dose:  100 mg


Fenofibrate (Tricor)  145 mg PO DAILY [facility]


   Last Admin: 12/01/17 09:39 Dose:  145 mg


Heparin Sodium (Porcine) (Heparin)  5,000 units SC Q8 [facility]


   Last Admin: 12/01/17 13:30 Dose:  5,000 units


Hydromorphone HCl (Dilaudid)  0.5 mg IVP Q4H PRN


   PRN Reason: Pain, moderate (4-7)


   Last Admin: 12/01/17 13:31 Dose:  0.5 mg


Metronidazole (Flagyl)  500 mg in 100 mls @ 100 mls/hr IVPB Q8H [facility]


   Last Admin: 12/01/17 09:00 Dose:  100 mls/hr


Vancomycin HCl 1 gm/ Sodium (Chloride)  250 mls @ 166.7 mls/hr IVPB Q24H [facility]


Ondansetron HCl (Zofran Tab)  4 mg PO Q6 PRN


   PRN Reason: Nausea/Vomiting


Pantoprazole Sodium (Protonix Ec Tab)  40 mg PO DAILY [facility]


   Last Admin: 12/01/17 09:39 Dose:  40 mg


Rosuvastatin Calcium (Crestor)  10 mg PO HS [facility]


   Last Admin: 11/30/17 21:20 Dose:  10 mg











- Labs


Labs: 


 





 12/01/17 06:42 





 12/01/17 06:42 





 











PT  14.4 SECONDS (9.7-12.2)  H  11/24/17  11:15    


 


INR  1.3   11/24/17  11:15    


 


APTT  32 SECONDS (21-34)   11/24/17  11:15

## 2017-12-01 NOTE — CP.PCM.PN
Subjective





- Date & Time of Evaluation


Date of Evaluation: 12/01/17


Time of Evaluation: 13:12





- Subjective


Subjective: 





Surgery progress note. Dr. Rsoenbaum





Pt seen and examined at bedside. no acute events overnight. Pain well 

tolerated. no N/V/D. Denies ambulation. No new complaints. 





Objective





- Vital Signs/Intake and Output


Vital Signs (last 24 hours): 


 











Temp Pulse Resp BP Pulse Ox


 


 97.7 F   100 H  20   153/82 H  97 


 


 12/01/17 08:41  12/01/17 08:41  12/01/17 08:41  12/01/17 08:41  12/01/17 08:41








Intake and Output: 


 











 12/01/17 12/01/17





 06:59 18:59


 


Output Total 205 


 


Balance -205 














- Medications


Medications: 


 Current Medications





Acetaminophen (Tylenol 325mg Tab)  650 mg PO Q6 PRN


   PRN Reason: Temperature


   Last Admin: 11/27/17 20:24 Dose:  650 mg


Albuterol Sulfate (Albuterol 0.042% Inhal Sol (1.25mg/3ml) Ud)  1.25 mg INH RQ6 

[facility]


   Last Admin: 12/01/17 07:36 Dose:  1.25 mg


Amlodipine Besylate (Norvasc)  5 mg PO DAILY [facility]


   Last Admin: 12/01/17 09:39 Dose:  5 mg


Aspirin (Ecotrin)  81 mg PO DAILY [facility]


   Last Admin: 12/01/17 09:40 Dose:  81 mg


Docusate Sodium (Colace)  100 mg PO BID [facility]


   Last Admin: 12/01/17 09:39 Dose:  100 mg


Fenofibrate (Tricor)  145 mg PO DAILY [facility]


   Last Admin: 12/01/17 09:39 Dose:  145 mg


Heparin Sodium (Porcine) (Heparin)  5,000 units SC Q8 [facility]


   Last Admin: 12/01/17 05:20 Dose:  5,000 units


Hydromorphone HCl (Dilaudid)  0.5 mg IVP Q4H PRN


   PRN Reason: Pain, moderate (4-7)


   Last Admin: 12/01/17 05:47 Dose:  0.5 mg


Ciprofloxacin (Cipro 400mg/200ml Dsw)  400 mg in 200 mls @ 133 mls/hr IVPB Q12H 

[facility]


   Last Admin: 12/01/17 05:21 Dose:  133 mls/hr


Metronidazole (Flagyl)  500 mg in 100 mls @ 100 mls/hr IVPB Q8H [facility]


   Last Admin: 12/01/17 09:00 Dose:  100 mls/hr


Ondansetron HCl (Zofran Tab)  4 mg PO Q6 PRN


   PRN Reason: Nausea/Vomiting


Pantoprazole Sodium (Protonix Ec Tab)  40 mg PO DAILY [facility]


   Last Admin: 12/01/17 09:39 Dose:  40 mg


Rosuvastatin Calcium (Crestor)  10 mg PO HS [facility]


   Last Admin: 11/30/17 21:20 Dose:  10 mg











- Labs


Labs: 


 





 12/01/17 06:42 





 12/01/17 06:42 





 











PT  14.4 SECONDS (9.7-12.2)  H  11/24/17  11:15    


 


INR  1.3   11/24/17  11:15    


 


APTT  32 SECONDS (21-34)   11/24/17  11:15    














- Constitutional


Appears: Non-toxic, No Acute Distress





- Head Exam


Head Exam: ATRAUMATIC, NORMAL INSPECTION, NORMOCEPHALIC





- Eye Exam


Eye Exam: EOMI, Normal appearance.  absent: Scleral icterus





- ENT Exam


ENT Exam: Mucous Membranes Moist





- Cardiovascular Exam


Cardiovascular Exam: RRR, +S1, +S2.  absent: JVD





- GI/Abdominal Exam


GI & Abdominal Exam: Soft.  absent: Guarding, Rigid, Tenderness, Rebound


Additional comments: 





Dressing clean, dry and intact. Drain in place with minimal output. 





- Extremities Exam


Extremities Exam: Normal Inspection.  absent: Calf Tenderness





- Neurological Exam


Neurological Exam: Alert, Awake, Oriented x3





Assessment and Plan





- Assessment and Plan (Free Text)


Assessment: 





81yo M with acute gangrenous cholecystitis s/p lap converted to open 

cholecystectomy with IOC POD#3 





- Afebrile, mild tachycardia. No distress


- Leukocytosis down-trending


- H/H stable. Will continue to monitor


- Drain will be removed today


- Continue IV Abx


- Encourage ambulation and IS use





Further recs as per Dr. Ilsa Vázquez PGY1


surgery pager: [phone]

## 2017-12-02 LAB
ALBUMIN/GLOB SERPL: 1 {RATIO} (ref 1–2.1)
ALP SERPL-CCNC: 99 U/L (ref 38–126)
ALT SERPL-CCNC: 60 U/L (ref 21–72)
AST SERPL-CCNC: 58 U/L (ref 17–59)
BASOPHILS # BLD AUTO: 0.1 K/UL (ref 0–0.2)
BASOPHILS NFR BLD: 0.5 % (ref 0–2)
BILIRUB SERPL-MCNC: 0.8 MG/DL (ref 0.2–1.3)
BUN SERPL-MCNC: 13 MG/DL (ref 9–20)
CALCIUM SERPL-MCNC: 7.8 MG/DL (ref 8.6–10.4)
CHLORIDE SERPL-SCNC: 105 MMOL/L (ref 98–107)
CO2 SERPL-SCNC: 23 MMOL/L (ref 22–30)
EOSINOPHIL # BLD AUTO: 0.2 K/UL (ref 0–0.7)
EOSINOPHIL NFR BLD: 2.1 % (ref 0–4)
EOSINOPHIL NFR BLD: 4 % (ref 0–4)
ERYTHROCYTE [DISTWIDTH] IN BLOOD BY AUTOMATED COUNT: 18.7 % (ref 11.5–14.5)
GLOBULIN SER-MCNC: 3.2 GM/DL (ref 2.2–3.9)
GLUCOSE SERPL-MCNC: 121 MG/DL (ref 75–110)
HCT VFR BLD CALC: 26.9 % (ref 35–51)
LYMPHOCYTES # BLD AUTO: 1.6 K/UL (ref 1–4.3)
LYMPHOCYTES NFR BLD AUTO: 13.6 % (ref 20–40)
MCH RBC QN AUTO: 23.3 PG (ref 27–31)
MCHC RBC AUTO-ENTMCNC: 32.5 G/DL (ref 33–37)
MCV RBC AUTO: 71.5 FL (ref 80–94)
METAMYELOCYTES NFR BLD: 2 % (ref 0–0)
MONOCYTES # BLD: 1.2 K/UL (ref 0–0.8)
MONOCYTES NFR BLD: 10.3 % (ref 0–10)
MYELOCYTES NFR BLD: 4 % (ref 0–0)
NEUTROPHILS NFR BLD AUTO: 63 % (ref 50–75)
NRBC BLD AUTO-RTO: 0.1 % (ref 0–2)
PLATELET # BLD: 392 K/UL (ref 130–400)
PMV BLD AUTO: 7.3 FL (ref 7.2–11.7)
POTASSIUM SERPL-SCNC: 3.3 MMOL/L (ref 3.6–5.2)
PROT SERPL-MCNC: 6.4 G/DL (ref 6.3–8.3)
SODIUM SERPL-SCNC: 137 MMOL/L (ref 132–148)
TOTAL CELLS COUNTED BLD: 100
WBC # BLD AUTO: 11.7 K/UL (ref 4.8–10.8)

## 2017-12-02 RX ADMIN — WATER SCH MLS/HR: 1 INJECTION INTRAMUSCULAR; INTRAVENOUS; SUBCUTANEOUS at 09:00

## 2017-12-02 RX ADMIN — TAZOBACTAM SODIUM AND PIPERACILLIN SODIUM SCH MLS/HR: 375; 3 INJECTION, SOLUTION INTRAVENOUS at 17:26

## 2017-12-02 RX ADMIN — ALBUTEROL SULFATE SCH MG: 1.25 SOLUTION RESPIRATORY (INHALATION) at 07:35

## 2017-12-02 RX ADMIN — ALBUTEROL SULFATE SCH MG: 1.25 SOLUTION RESPIRATORY (INHALATION) at 14:23

## 2017-12-02 RX ADMIN — WATER SCH MLS/HR: 1 INJECTION INTRAMUSCULAR; INTRAVENOUS; SUBCUTANEOUS at 16:41

## 2017-12-02 RX ADMIN — TAZOBACTAM SODIUM AND PIPERACILLIN SODIUM SCH MLS/HR: 375; 3 INJECTION, SOLUTION INTRAVENOUS at 10:00

## 2017-12-02 RX ADMIN — PANTOPRAZOLE SODIUM SCH MG: 40 TABLET, DELAYED RELEASE ORAL at 09:19

## 2017-12-02 RX ADMIN — HYDROMORPHONE HYDROCHLORIDE PRN MG: 1 INJECTION, SOLUTION INTRAMUSCULAR; INTRAVENOUS; SUBCUTANEOUS at 00:40

## 2017-12-02 RX ADMIN — WATER SCH MLS/HR: 1 INJECTION INTRAMUSCULAR; INTRAVENOUS; SUBCUTANEOUS at 00:41

## 2017-12-02 RX ADMIN — ALBUTEROL SULFATE SCH MG: 1.25 SOLUTION RESPIRATORY (INHALATION) at 01:07

## 2017-12-02 RX ADMIN — ALBUTEROL SULFATE SCH MG: 1.25 SOLUTION RESPIRATORY (INHALATION) at 19:44

## 2017-12-02 RX ADMIN — TAZOBACTAM SODIUM AND PIPERACILLIN SODIUM SCH MLS/HR: 375; 3 INJECTION, SOLUTION INTRAVENOUS at 01:56

## 2017-12-02 NOTE — CP.PCM.PN
Subjective





- Date & Time of Evaluation


Date of Evaluation: 12/02/17


Time of Evaluation: 07:25





- Subjective


Subjective: 





Surgery Progress note. Dr. Rosenbaum





Pt seen and examined at bedside. No acute events overnight. Does report some 

increased distention this morning. Does report flatus, no BM. No N/V/D. Abd 

pain well tolerated. No F/C. No new complaints. 





Objective





- Vital Signs/Intake and Output


Vital Signs (last 24 hours): 


 











Temp Pulse Resp BP Pulse Ox


 


 97.9 F   83   20   137/75   98 


 


 12/02/17 17:40  12/02/17 17:40  12/02/17 17:40  12/02/17 17:40  12/02/17 17:40











- Medications


Medications: 


 Current Medications





Acetaminophen (Tylenol 325mg Tab)  650 mg PO Q6 PRN


   PRN Reason: Temperature


   Last Admin: 12/02/17 16:42 Dose:  650 mg


Albuterol Sulfate (Albuterol 0.042% Inhal Sol (1.25mg/3ml) Ud)  1.25 mg INH RQ6 

[facility]


   Last Admin: 12/02/17 19:44 Dose:  1.25 mg


Amlodipine Besylate (Norvasc)  5 mg PO DAILY [facility]


   Last Admin: 12/02/17 09:19 Dose:  5 mg


Aspirin (Ecotrin)  81 mg PO DAILY [facility]


   Last Admin: 12/02/17 09:19 Dose:  81 mg


Docusate Sodium (Colace)  100 mg PO BID [facility]


   Last Admin: 12/02/17 17:26 Dose:  100 mg


Fenofibrate (Tricor)  145 mg PO DAILY [facility]


   Last Admin: 12/02/17 09:19 Dose:  145 mg


Hydromorphone HCl (Dilaudid)  0.5 mg IVP Q4H PRN


   PRN Reason: Pain, moderate (4-7)


   Last Admin: 12/02/17 00:40 Dose:  0.5 mg


Metronidazole (Flagyl)  500 mg in 100 mls @ 100 mls/hr IVPB Q8H [facility]


   Last Admin: 12/02/17 16:41 Dose:  100 mls/hr


Piperacillin Sod/Tazobactam Sod (Zosyn 3.375 Gm Iv Premix)  3.375 gm in 50 mls 

@ 100 mls/hr IVPB Q8H [facility]


   Last Admin: 12/02/17 17:26 Dose:  100 mls/hr


Ondansetron HCl (Zofran Tab)  4 mg PO Q6 PRN


   PRN Reason: Nausea/Vomiting


Pantoprazole Sodium (Protonix Ec Tab)  40 mg PO DAILY [facility]


   Last Admin: 12/02/17 09:19 Dose:  40 mg


Rosuvastatin Calcium (Crestor)  10 mg PO HS [facility]


   Last Admin: 12/02/17 21:57 Dose:  10 mg











- Labs


Labs: 


 





 12/02/17 08:05 





 12/02/17 08:05 





 











PT  14.4 SECONDS (9.7-12.2)  H  11/24/17  11:15    


 


INR  1.3   11/24/17  11:15    


 


APTT  32 SECONDS (21-34)   11/24/17  11:15    














- Constitutional


Appears: Well, No Acute Distress





- Head Exam


Head Exam: ATRAUMATIC, NORMAL INSPECTION, NORMOCEPHALIC





- Eye Exam


Eye Exam: EOMI, Normal appearance





- ENT Exam


ENT Exam: Mucous Membranes Moist





- Respiratory Exam


Respiratory Exam: NORMAL BREATHING PATTERN.  absent: Accessory Muscle Use, 

Respiratory Distress





- Cardiovascular Exam


Cardiovascular Exam: +S1, +S2.  absent: JVD





- GI/Abdominal Exam


GI & Abdominal Exam: Soft


Additional comments: 





dressing clean, dry and intact. Abd soft. mildly distended. No rebound, no 

guarding. Mild tenderness to palpation. 





- Extremities Exam


Extremities Exam: Normal Inspection.  absent: Calf Tenderness





- Neurological Exam


Neurological Exam: Alert, Awake, Oriented x3





- Psychiatric Exam


Psychiatric exam: Normal Affect, Normal Mood





- Skin


Skin Exam: Dry, Intact, Normal Color, Warm





Assessment and Plan





- Assessment and Plan (Free Text)


Assessment: 





83yo M with acute gangrenous cholecystitis s/p lap converted to open 

cholecystectomy with IOC POD#4


-GB tissue culture: Strep Viridans


- H/H stable





- Continue IV Abx as per ID


- Encourage OOB to chair. Encourage ambulation and IS use





Further recs as per Dr. Ilsa Vázquez PGY1


surgery pager: [phone]

## 2017-12-02 NOTE — CP.PCM.PN
Subjective





- Date & Time of Evaluation


Date of Evaluation: 12/02/17


Time of Evaluation: 21:02





- Subjective


Subjective: 





CHIEF COMPLAINTS TODAY :


c/o cough /pleuritic pain rt chest posteriorly.


mild postoperative pain .


NO BOWEL MOVEMENT YET





ROS.


HEENT :  N.


Resp :       POSITIVE COUGH,   no wheezing , +ve pleuritic CP ,no hemoptysis 


Cardio :     No anginal  CP, PND, orthopnea, palpitation 


GI :           No abd.pain, n/v ,diarrhea or GI bleeding .


CNS : No headache, vertigo, focal deficit.


Musculoskel :  No joint swelling ,


Derm :        No rash 


Psych :     Normal affect.


Ext :  No  swelling ,calf pain 





PE.


Pt. is alert awake in no distress.


V.S  As noted in the chart 


Head ,ear nose,throat and eyes : Normal.


Neck : Supple with normal carotids.


Lungs:  RHONCHI RIGHT SIDE POSTERIORLY.


Heart : S1 & S2 normal with S4. No murmur.


Abd : Soft , MILD TENDERNESS POSTOPERATIVE WOUND SITE with HYPOACTIVE bowel 

sounds.


Neuro : Moves all ext. with no localized deficit.


Ext : No edema with intact pulses.Non tender calves 


Derm : No rashes or decubitus ulcer.





LABS/RADIOLOGY:


REVIEWED





ASSESSMENT/PLAN : 


POSTOPERATIVE S/P OPEN CHOLECYSTECTOMY.


GANGRENOUS GALLBLADDER +VE STREP VIRIDANS


lEUKOCYTOSIS


R/O PNEUMONIA ? ASPIRATION.


ASTHMA.





PLAN 


WILL CHECK CHEST X-RAY.


CONTINUE iv ANTIBIOTICS iv zOSYN 3.375 EVERY 8 HOURLY 12/2/17


iv fLAGYL 500 MG EVERY 8 HOURLY.12/2/17


PULMONARY TOILET/SPIROMETRY AT BEDSIDE.





Objective





- Vital Signs/Intake and Output


Vital Signs (last 24 hours): 


 











Temp Pulse Resp BP Pulse Ox


 


 97.9 F   83   20   137/75   98 


 


 12/02/17 17:40  12/02/17 17:40  12/02/17 17:40  12/02/17 17:40  12/02/17 17:40











- Medications


Medications: 


 Current Medications





Acetaminophen (Tylenol 325mg Tab)  650 mg PO Q6 PRN


   PRN Reason: Temperature


   Last Admin: 12/02/17 16:42 Dose:  650 mg


Albuterol Sulfate (Albuterol 0.042% Inhal Sol (1.25mg/3ml) Ud)  1.25 mg INH RQ6 

[facility]


   Last Admin: 12/02/17 19:44 Dose:  1.25 mg


Amlodipine Besylate (Norvasc)  5 mg PO DAILY [facility]


   Last Admin: 12/02/17 09:19 Dose:  5 mg


Aspirin (Ecotrin)  81 mg PO DAILY [facility]


   Last Admin: 12/02/17 09:19 Dose:  81 mg


Docusate Sodium (Colace)  100 mg PO BID [facility]


   Last Admin: 12/02/17 17:26 Dose:  100 mg


Fenofibrate (Tricor)  145 mg PO DAILY [facility]


   Last Admin: 12/02/17 09:19 Dose:  145 mg


Heparin Sodium (Porcine) (Heparin)  5,000 units SC Q8 [facility]


   Last Admin: 12/02/17 13:39 Dose:  5,000 units


Hydromorphone HCl (Dilaudid)  0.5 mg IVP Q4H PRN


   PRN Reason: Pain, moderate (4-7)


   Last Admin: 12/02/17 00:40 Dose:  0.5 mg


Metronidazole (Flagyl)  500 mg in 100 mls @ 100 mls/hr IVPB Q8H [facility]


   Last Admin: 12/02/17 16:41 Dose:  100 mls/hr


Piperacillin Sod/Tazobactam Sod (Zosyn 3.375 Gm Iv Premix)  3.375 gm in 50 mls 

@ 100 mls/hr IVPB Q8H [facility]


   Last Admin: 12/02/17 17:26 Dose:  100 mls/hr


Ondansetron HCl (Zofran Tab)  4 mg PO Q6 PRN


   PRN Reason: Nausea/Vomiting


Pantoprazole Sodium (Protonix Ec Tab)  40 mg PO DAILY [facility]


   Last Admin: 12/02/17 09:19 Dose:  40 mg


Rosuvastatin Calcium (Crestor)  10 mg PO HS [facility]


   Last Admin: 12/01/17 21:27 Dose:  10 mg











- Labs


Labs: 


 





 12/02/17 08:05 





 12/02/17 08:05 





 











PT  14.4 SECONDS (9.7-12.2)  H  11/24/17  11:15    


 


INR  1.3   11/24/17  11:15    


 


APTT  32 SECONDS (21-34)   11/24/17  11:15    














Assessment and Plan


(1) Acute gangrenous cholecystitis


Status: Acute   





(2) Status post cholecystectomy


Status: Acute   





(3) Abdominal pain


Status: Acute   





(4) Asthma


Status: Acute

## 2017-12-03 RX ADMIN — ALBUTEROL SULFATE SCH MG: 1.25 SOLUTION RESPIRATORY (INHALATION) at 13:30

## 2017-12-03 RX ADMIN — HYDROMORPHONE HYDROCHLORIDE PRN MG: 1 INJECTION, SOLUTION INTRAMUSCULAR; INTRAVENOUS; SUBCUTANEOUS at 01:02

## 2017-12-03 RX ADMIN — TAZOBACTAM SODIUM AND PIPERACILLIN SODIUM SCH MLS/HR: 375; 3 INJECTION, SOLUTION INTRAVENOUS at 10:20

## 2017-12-03 RX ADMIN — WATER SCH MLS/HR: 1 INJECTION INTRAMUSCULAR; INTRAVENOUS; SUBCUTANEOUS at 00:58

## 2017-12-03 RX ADMIN — ALBUTEROL SULFATE SCH MG: 1.25 SOLUTION RESPIRATORY (INHALATION) at 19:41

## 2017-12-03 RX ADMIN — ALBUTEROL SULFATE SCH MG: 1.25 SOLUTION RESPIRATORY (INHALATION) at 01:20

## 2017-12-03 RX ADMIN — WATER SCH MLS/HR: 1 INJECTION INTRAMUSCULAR; INTRAVENOUS; SUBCUTANEOUS at 09:20

## 2017-12-03 RX ADMIN — PANTOPRAZOLE SODIUM SCH MG: 40 TABLET, DELAYED RELEASE ORAL at 09:16

## 2017-12-03 RX ADMIN — TAZOBACTAM SODIUM AND PIPERACILLIN SODIUM SCH MLS/HR: 375; 3 INJECTION, SOLUTION INTRAVENOUS at 02:00

## 2017-12-03 RX ADMIN — ALBUTEROL SULFATE SCH MG: 1.25 SOLUTION RESPIRATORY (INHALATION) at 07:57

## 2017-12-03 RX ADMIN — TAZOBACTAM SODIUM AND PIPERACILLIN SODIUM SCH MLS/HR: 375; 3 INJECTION, SOLUTION INTRAVENOUS at 18:04

## 2017-12-03 RX ADMIN — WATER SCH MLS/HR: 1 INJECTION INTRAMUSCULAR; INTRAVENOUS; SUBCUTANEOUS at 16:22

## 2017-12-03 NOTE — PN
DAILY PROGRESS NOTE



DATE:  12/03/2017



SUBJECTIVE:  The patient is seen today, 12/03/2017.  He is so far

tolerating diet.  There is abdominal pain at the site of the surgery and

the patient is on vancomycin treating the strep infection of the gangrenous

gallbladder.



OBJECTIVE:

VITAL SIGNS:  Blood pressure 146/67, temperature 98.2, respiratory rate 18

and pulse 90.

HEENT:  Pupils equal, reactive to light.  Pallor mucosa of the

conjunctivae.

NECK:  Supple.  No JVD.  No carotid bruit.  No lymph node.  No thyromegaly.

CHEST AND LUNGS:  Bilateral symmetrical expansion.  Good air exchange.  No

rales.  No rhonchi.

CARDIOVASCULAR SYSTEM:  PMI not localized.  S1, S2.  No additional sounds.

ABDOMEN:  Normoactive bowel sounds.  No tenderness.  No organomegaly.  No

masses.

EXTREMITIES:  No cyanosis .  No clubbing.  No edema.

CNS:  Alert, awake, oriented x2 and no neurological deficit could be

appreciated.



ASSESSMENT:

1.  Status post open cholecystectomy found to have gangrenous gallbladder.

2.  Anemia of acute blood loss.

3.  Hypertension.

4.  Hypercholesterolemia.



PLAN:  We will supplement iron and continue physical therapy.  Continue

current IV antibiotics as per ID.





__________________________________________

Miguel MD Joshua





DD:  12/03/2017 21:25:23

DT:  12/03/2017 21:26:46

Job # [id number]

## 2017-12-03 NOTE — RAD
HISTORY:

Cough  



COMPARISON:

Portable chest 11/23/2017. 



FINDINGS:



LUNGS:

Diminished inspiratory volume is appreciate bilaterally. Limited 

airspace disease seen in the left retrocardiac space. Borderline 

right infrahilar airspace disease identified in the interval.



PLEURA:

Minimal right pleural effusion is noted.  None is seen at the left. 

No pneumothorax bilaterally.



CARDIOVASCULAR:

Normal.



OSSEOUS STRUCTURES:

No significant abnormalities.



VISUALIZED UPPER ABDOMEN:

Normal.



OTHER FINDINGS:

None.



IMPRESSION:

Minimal right pleural effusion identified with questionable airspace 

disease in the retrocardiac space well as right infrahilar space in 

the interval.  This may be a function of limited inspiratory effort.  

Please see discussion above.

## 2017-12-03 NOTE — CP.PCM.PN
Subjective





- Date & Time of Evaluation


Date of Evaluation: 12/03/17


Time of Evaluation: 07:45





- Subjective


Subjective: 





Surgery progress note. Dr. Rosenbaum





Pt seen and examined at bedside. No acute events overnight. Patient reports 

large BM this morning. Denies any F/C. No N/V/D. No new complaints. 





Objective





- Vital Signs/Intake and Output


Vital Signs (last 24 hours): 


 











Temp Pulse Resp BP Pulse Ox


 


 98.6 F   81   20   156/73 H  99 


 


 12/03/17 07:00  12/03/17 07:00  12/03/17 07:00  12/03/17 07:00  12/03/17 07:00








Intake and Output: 


 











 12/03/17 12/03/17





 06:59 18:59


 


Intake Total 390 


 


Output Total 600 


 


Balance -210 














- Medications


Medications: 


 Current Medications





Acetaminophen (Tylenol 325mg Tab)  650 mg PO Q6 PRN


   PRN Reason: Temperature


   Last Admin: 12/02/17 16:42 Dose:  650 mg


Albuterol Sulfate (Albuterol 0.042% Inhal Sol (1.25mg/3ml) Ud)  1.25 mg INH RQ6 

[facility]


   Last Admin: 12/03/17 07:57 Dose:  1.25 mg


Amlodipine Besylate (Norvasc)  5 mg PO DAILY [facility]


   Last Admin: 12/03/17 09:16 Dose:  5 mg


Aspirin (Ecotrin)  81 mg PO DAILY [facility]


   Last Admin: 12/03/17 09:16 Dose:  81 mg


Docusate Sodium (Colace)  100 mg PO BID [facility]


   Last Admin: 12/03/17 09:16 Dose:  100 mg


Fenofibrate (Tricor)  145 mg PO DAILY [facility]


   Last Admin: 12/03/17 09:17 Dose:  145 mg


Hydromorphone HCl (Dilaudid)  0.5 mg IVP Q4H PRN


   PRN Reason: Pain, moderate (4-7)


   Last Admin: 12/03/17 01:02 Dose:  0.5 mg


Metronidazole (Flagyl)  500 mg in 100 mls @ 100 mls/hr IVPB Q8H [facility]


   Last Admin: 12/03/17 09:20 Dose:  100 mls/hr


Piperacillin Sod/Tazobactam Sod (Zosyn 3.375 Gm Iv Premix)  3.375 gm in 50 mls 

@ 100 mls/hr IVPB Q8H [facility]


   Last Admin: 12/03/17 10:20 Dose:  100 mls/hr


Ondansetron HCl (Zofran Tab)  4 mg PO Q6 PRN


   PRN Reason: Nausea/Vomiting


Pantoprazole Sodium (Protonix Ec Tab)  40 mg PO DAILY [facility]


   Last Admin: 12/03/17 09:16 Dose:  40 mg


Rosuvastatin Calcium (Crestor)  10 mg PO HS [facility]


   Last Admin: 12/02/17 21:57 Dose:  10 mg











- Labs


Labs: 


 





 12/02/17 08:05 





 12/02/17 08:05 





 











PT  14.4 SECONDS (9.7-12.2)  H  11/24/17  11:15    


 


INR  1.3   11/24/17  11:15    


 


APTT  32 SECONDS (21-34)   11/24/17  11:15    














- Constitutional


Appears: Well, No Acute Distress





- Head Exam


Head Exam: ATRAUMATIC, NORMAL INSPECTION, NORMOCEPHALIC





- Eye Exam


Eye Exam: EOMI





- ENT Exam


ENT Exam: Mucous Membranes Moist





- Respiratory Exam


Respiratory Exam: NORMAL BREATHING PATTERN.  absent: Accessory Muscle Use, 

Respiratory Distress





- Cardiovascular Exam


Cardiovascular Exam: RRR, +S1, +S2.  absent: JVD





- GI/Abdominal Exam


GI & Abdominal Exam: Soft


Additional comments: 





periincisional tenderness. Dressings clean, dry and intact.





- Extremities Exam


Extremities Exam: Normal Inspection.  absent: Calf Tenderness





- Neurological Exam


Neurological Exam: Alert, Awake, Oriented x3





- Psychiatric Exam


Psychiatric exam: Normal Affect, Normal Mood





- Skin


Skin Exam: Dry, Intact, Normal Color, Warm





Assessment and Plan





- Assessment and Plan (Free Text)


Assessment: 





83yo M with acute gangrenous cholecystitis s/p lap converted to open 

cholecystectomy with IOC POD#5


-GB tissue culture: Strep Viridans





- Continue Abx as per ID


- Encourage OOB to chair and ambulation. Encourage IS use


- Cleared for discharge from surgical standpoint. F/u with Dr. Rosenbaum in 1 

week





Further recs as per Dr. Ilsa Vázquez PGY1


surgery pager: [phone]

## 2017-12-04 LAB
ALBUMIN/GLOB SERPL: 0.8 {RATIO} (ref 1–2.1)
ALP SERPL-CCNC: 90 U/L (ref 38–126)
ALT SERPL-CCNC: 45 U/L (ref 21–72)
AST SERPL-CCNC: 33 U/L (ref 17–59)
BASOPHILS # BLD AUTO: 0.1 K/UL (ref 0–0.2)
BASOPHILS NFR BLD: 0.5 % (ref 0–2)
BILIRUB DIRECT SERPL-MCNC: 0.2 MG/DL (ref 0–0.4)
BILIRUB SERPL-MCNC: 0.4 MG/DL (ref 0.2–1.3)
BUN SERPL-MCNC: 10 MG/DL (ref 9–20)
CALCIUM SERPL-MCNC: 7.8 MG/DL (ref 8.6–10.4)
CHLORIDE SERPL-SCNC: 107 MMOL/L (ref 98–107)
CO2 SERPL-SCNC: 22 MMOL/L (ref 22–30)
EOSINOPHIL # BLD AUTO: 0.3 K/UL (ref 0–0.7)
EOSINOPHIL NFR BLD: 2.3 % (ref 0–4)
ERYTHROCYTE [DISTWIDTH] IN BLOOD BY AUTOMATED COUNT: 19 % (ref 11.5–14.5)
GLOBULIN SER-MCNC: 3.9 GM/DL (ref 2.2–3.9)
GLUCOSE SERPL-MCNC: 97 MG/DL (ref 75–110)
HCT VFR BLD CALC: 25.6 % (ref 35–51)
LYMPHOCYTES # BLD AUTO: 1.3 K/UL (ref 1–4.3)
LYMPHOCYTES NFR BLD AUTO: 11.6 % (ref 20–40)
MCH RBC QN AUTO: 23.9 PG (ref 27–31)
MCHC RBC AUTO-ENTMCNC: 33.1 G/DL (ref 33–37)
MCV RBC AUTO: 72.1 FL (ref 80–94)
MONOCYTES # BLD: 1 K/UL (ref 0–0.8)
MONOCYTES NFR BLD: 8.7 % (ref 0–10)
NRBC BLD AUTO-RTO: 0 % (ref 0–2)
PLATELET # BLD: 449 K/UL (ref 130–400)
PMV BLD AUTO: 7.2 FL (ref 7.2–11.7)
POTASSIUM SERPL-SCNC: 3.5 MMOL/L (ref 3.6–5.2)
PROT SERPL-MCNC: 7 G/DL (ref 6.3–8.3)
SODIUM SERPL-SCNC: 136 MMOL/L (ref 132–148)
WBC # BLD AUTO: 11.5 K/UL (ref 4.8–10.8)

## 2017-12-04 RX ADMIN — TAZOBACTAM SODIUM AND PIPERACILLIN SODIUM SCH MLS/HR: 375; 3 INJECTION, SOLUTION INTRAVENOUS at 12:52

## 2017-12-04 RX ADMIN — ALBUTEROL SULFATE SCH MG: 1.25 SOLUTION RESPIRATORY (INHALATION) at 07:21

## 2017-12-04 RX ADMIN — WATER SCH MLS/HR: 1 INJECTION INTRAMUSCULAR; INTRAVENOUS; SUBCUTANEOUS at 09:40

## 2017-12-04 RX ADMIN — TAZOBACTAM SODIUM AND PIPERACILLIN SODIUM SCH MLS/HR: 375; 3 INJECTION, SOLUTION INTRAVENOUS at 18:02

## 2017-12-04 RX ADMIN — PANTOPRAZOLE SODIUM SCH MG: 40 TABLET, DELAYED RELEASE ORAL at 09:40

## 2017-12-04 RX ADMIN — HYDROMORPHONE HYDROCHLORIDE PRN MG: 1 INJECTION, SOLUTION INTRAMUSCULAR; INTRAVENOUS; SUBCUTANEOUS at 00:07

## 2017-12-04 RX ADMIN — ALBUTEROL SULFATE SCH MG: 1.25 SOLUTION RESPIRATORY (INHALATION) at 13:48

## 2017-12-04 RX ADMIN — ALBUTEROL SULFATE SCH MG: 1.25 SOLUTION RESPIRATORY (INHALATION) at 08:19

## 2017-12-04 RX ADMIN — WATER SCH MLS/HR: 1 INJECTION INTRAMUSCULAR; INTRAVENOUS; SUBCUTANEOUS at 00:10

## 2017-12-04 RX ADMIN — ALBUTEROL SULFATE SCH MG: 1.25 SOLUTION RESPIRATORY (INHALATION) at 19:43

## 2017-12-04 RX ADMIN — TAZOBACTAM SODIUM AND PIPERACILLIN SODIUM SCH MLS/HR: 375; 3 INJECTION, SOLUTION INTRAVENOUS at 02:00

## 2017-12-04 RX ADMIN — ALBUTEROL SULFATE SCH MG: 1.25 SOLUTION RESPIRATORY (INHALATION) at 02:06

## 2017-12-04 NOTE — RAD
HISTORY:

COUGH, R/O INFILTRATE  



COMPARISON:

Chest x-ray performed 12/3/17 



TECHNIQUE:

Chest PA and lateral



FINDINGS:





LUNGS:

Right lower lobe opacity may reflect consolidation and/or pleural 

effusion. 



Wedge shaped or triangular opacity abutting the left heart border in 

the left lower lobe, correlate for consolidation or atelectasis.



Biapical pleural thickening. No definite pneumothorax. 



Please note that chest x-ray has limited sensitivity for the 

detection of pulmonary masses.



CARDIOVASCULAR:

Cardiomegaly.



OSSEOUS STRUCTURES:

 Degenerative changes of the spine. Osseous proliferation at the AC 

joint. 



VISUALIZED UPPER ABDOMEN:

Unremarkable.



OTHER FINDINGS:

Right upper quadrant surgical clips.



IMPRESSION:

Right lower lobe opacity may reflect consolidation and/or pleural 

effusion.  Follow-up to complete resolution in order to exclude 

possibility of underlying neoplasm. 



Wedge shaped or triangular opacity abutting the left heart border in 

the left lower lobe, correlate for consolidation or atelectasis.

## 2017-12-04 NOTE — PN
DATE:  11/30/2017



DAILY PROGRESS NOTE



SUBJECTIVE:  The patient was postoperative, day #2.  Still complaining of

abdominal pain at the site of the surgery.



PHYSICAL EXAMINATION:

VITAL SIGNS:  Blood pressure 142/71, temperature 98.3, respiratory rate 20

and pulse 69.

HEENT:  Pupils equal, reactive to light.  Normal-appearing mucosa of the

conjunctivae, oropharyngeal and nasal membrane mucosa.

NECK:  Supple.  No JVD.  No carotid bruit.  No lymph node.  No thyromegaly.

CHEST AND LUNGS:  Bilateral symmetrical expansion.  Good air exchange.  No

rales.  No rhonchi.

CARDIOVASCULAR SYSTEM:  PMI not localized.  S1 and S2.  No additional

sounds.

ABDOMEN:  Normoactive bowel sounds.  No tenderness.  No organomegaly.  No

masses.

EXTREMITIES:  No cyanosis.  No clubbing.  No edema.

CENTRAL NERVOUS SYSTEM:  Alert, awake, oriented x2.  No neurological

deficit could be appreciated.



ASSESSMENT:

1.  Postoperative day #2, status post cholecystectomy of gangrenous

gallbladder.

2.  Hypertension.

3.  Hypercholesterolemia.



PLAN:  Advance diet as per Surgery.  Continue monitor electrolytes,

physical therapy.



__________________________________________

Miguel MD Joshua





DD:  12/01/2017 8:25:19

DT:  12/01/2017 9:05:41

Job # [id number]

## 2017-12-04 NOTE — CP.PCM.PN
Subjective





- Date & Time of Evaluation


Date of Evaluation: 12/04/17


Time of Evaluation: 19:18





- Subjective


Subjective: 





CHIEF COMPLAINTS TODAY :


C/O PLEURITIC CHEST PAIN RIGHT/LEFT SIDE POSTERIORLY ON COUGHING


mild postoperative pain 


ABDOMINAL WOUND +VE STAPLES.


Minimal drainage from the Michael-Roca site on the dressing.


patient moved his bowels.





ROS.


HEENT :  N.


Resp :       POSITIVE COUGH,   no wheezing , +ve pleuritic CP ,no hemoptysis 


Cardio :     No anginal  CP, PND, orthopnea, palpitation 


GI :           No abd.pain, n/v ,diarrhea or GI bleeding .


CNS : No headache, vertigo, focal deficit.


Musculoskel :  No joint swelling ,


Derm :        No rash 


Psych :     Normal affect.


Ext :  No  swelling ,calf pain 





PE.


Pt. is alert awake in no distress.


V.S  As noted in the chart 


Head ,ear nose,throat and eyes : Normal.


Neck : Supple with normal carotids.


Lungs:  RHONCHI RIGHT SIDE,BRONCHIAL BS AT LT BASE POSTERIORLY


Heart : S1 & S2 normal with S4. No murmur.


Abd : Soft , MILD TENDERNESS POSTOPERATIVE WOUND SITE with HYPOACTIVE bowel 

sounds.


Neuro : Moves all ext. with no localized deficit.


Ext : No edema with intact pulses.Non tender calves 


Derm : No rashes or decubitus ulcer.





LABS/RADIOLOGY:


CXR 12/4/17.right lower lobe opacity/consolidation ?effusion.  Left side wedge-

shaped opacity


wbc 11.5 improving h./h 8.5/25.6.





CREATININE 1.2/bun OF 10.


BLOOD CULTURES 12/1/17 -VE 





ASSESSMENT/PLAN : 


POSTOPERATIVE S/P OPEN CHOLECYSTECTOMY.


GANGRENOUS GALLBLADDER +VE STREP VIRIDANS


pleuritic chest pain


R/O PNEUMONIA  vs PE


ASTHMA.





PLAN 


CASE DISCUSSED WITH DR SHEIKH.  CONSIDER CT ANGIOGRAM PE- PROTOCOL R/O PE VS /

PNEUMONIA





CONTINUE iv ANTIBIOTICS iv ZOSYN 3.375 EVERY 8 HOURLY 12/2/17


iv fLAGYL 500 MG EVERY 8 HOURLY.12/2/17


d. dimer.


PULMONARY TOILET/SPIROMETRY AT BEDSIDE.








Objective





- Vital Signs/Intake and Output


Vital Signs (last 24 hours): 


 











Temp Pulse Resp BP Pulse Ox


 


 98.5 F   82   20   144/70   97 


 


 12/04/17 15:40  12/04/17 15:40  12/04/17 15:40  12/04/17 15:40  12/04/17 15:40








Intake and Output: 


 











 12/04/17 12/05/17





 18:59 06:59


 


Intake Total 630 


 


Output Total 450 


 


Balance 180 














- Medications


Medications: 


 Current Medications





Acetaminophen (Tylenol 325mg Tab)  650 mg PO Q6 PRN


   PRN Reason: Temperature


   Last Admin: 12/02/17 16:42 Dose:  650 mg


Albuterol Sulfate (Albuterol 0.042% Inhal Sol (1.25mg/3ml) Ud)  1.25 mg INH RQ6 

[facility]


   Last Admin: 12/04/17 13:48 Dose:  1.25 mg


Amlodipine Besylate (Norvasc)  5 mg PO DAILY [facility]


   Last Admin: 12/04/17 09:40 Dose:  5 mg


Aspirin (Ecotrin)  81 mg PO DAILY [facility]


   Last Admin: 12/04/17 09:40 Dose:  81 mg


Docusate Sodium (Colace)  100 mg PO BID [facility]


   Last Admin: 12/04/17 18:02 Dose:  100 mg


Fenofibrate (Tricor)  145 mg PO DAILY [facility]


   Last Admin: 12/04/17 10:52 Dose:  145 mg


Piperacillin Sod/Tazobactam Sod (Zosyn 3.375 Gm Iv Premix)  3.375 gm in 50 mls 

@ 100 mls/hr IVPB Q8H [facility]


   Last Admin: 12/04/17 18:02 Dose:  100 mls/hr


Ondansetron HCl (Zofran Tab)  4 mg PO Q6 PRN


   PRN Reason: Nausea/Vomiting


Pantoprazole Sodium (Protonix Ec Tab)  40 mg PO DAILY [facility]


   Last Admin: 12/04/17 09:40 Dose:  40 mg


Rosuvastatin Calcium (Crestor)  10 mg PO HS [facility]


   Last Admin: 12/03/17 21:30 Dose:  10 mg











- Labs


Labs: 


 





 12/04/17 06:24 





 12/04/17 06:24 





 











PT  14.4 SECONDS (9.7-12.2)  H  11/24/17  11:15    


 


INR  1.3   11/24/17  11:15    


 


APTT  32 SECONDS (21-34)   11/24/17  11:15    














Assessment and Plan


(1) Acute gangrenous cholecystitis


Status: Acute   





(2) Status post cholecystectomy


Status: Acute   





(3) Abdominal pain


Status: Acute   





(4) Asthma


Status: Acute

## 2017-12-04 NOTE — PN
DATE:  12/01/2017



SUBJECTIVE:  He is having less pain and he is tolerating diet so far.



PHYSICAL EXAMINATION:

VITAL SIGNS:  Blood pressure 117/62, temperature 98.6, respiratory rate 20

and pulse 100.

HEENT:  Pupils equal and reactive to light.  Normal-appearing mucosa of the

conjunctivae, oropharynx and nasal membrane mucosa.

NECK:  Supple.  No JVD.  No carotid bruit.  No lymph node.  No thyromegaly.

CHEST AND LUNGS:  Bilateral symmetrical expansion.  Good air exchange.  No

rales.  No rhonchi.

CARDIOVASCULAR SYSTEM:  PMI not localized.  S1, S2.  No additional sounds.

ABDOMEN:  Normoactive bowel sounds.  No tenderness.  No organomegaly.  No

masses.

EXTREMITIES:  No cyanosis.  No clubbing.  No edema.

CNS:  Alert, awake, oriented x3.  No neurological deficit could be

appreciated.



ASSESSMENT:

1.  Status post open cholecystectomy for gangrenous gallbladder.  Culture

of the gallbladder showed Streptococcus viridans which is sensitive to

vancomycin.

2.  Postoperative day #3 status post open cholecystectomy.

3.  Hypertension.

4.  Hypercholesterolemia.



PLAN:  ID consult.  Start the patient on vancomycin.  Discontinue Cipro. 

Advance diet as tolerated.  Follow surgical recommendations.







__________________________________________

Gem Lewis MD

 



DD:  12/01/2017 18:29:09

DT:  12/01/2017 18:31:02

Job # [id number]

## 2017-12-05 VITALS
TEMPERATURE: 98.6 F | RESPIRATION RATE: 18 BRPM | SYSTOLIC BLOOD PRESSURE: 126 MMHG | DIASTOLIC BLOOD PRESSURE: 70 MMHG | HEART RATE: 86 BPM | OXYGEN SATURATION: 98 %

## 2017-12-05 LAB
BASOPHILS # BLD AUTO: 0 K/UL (ref 0–0.2)
BASOPHILS NFR BLD: 0.4 % (ref 0–2)
BUN SERPL-MCNC: 9 MG/DL (ref 9–20)
CALCIUM SERPL-MCNC: 8.2 MG/DL (ref 8.6–10.4)
CHLORIDE SERPL-SCNC: 105 MMOL/L (ref 98–107)
CO2 SERPL-SCNC: 23 MMOL/L (ref 22–30)
EOSINOPHIL # BLD AUTO: 0.2 K/UL (ref 0–0.7)
EOSINOPHIL NFR BLD: 2.2 % (ref 0–4)
ERYTHROCYTE [DISTWIDTH] IN BLOOD BY AUTOMATED COUNT: 19.7 % (ref 11.5–14.5)
GLUCOSE SERPL-MCNC: 119 MG/DL (ref 75–110)
HCT VFR BLD CALC: 29.9 % (ref 35–51)
LYMPHOCYTES # BLD AUTO: 1.4 K/UL (ref 1–4.3)
LYMPHOCYTES NFR BLD AUTO: 13.3 % (ref 20–40)
MCH RBC QN AUTO: 23.8 PG (ref 27–31)
MCHC RBC AUTO-ENTMCNC: 32.9 G/DL (ref 33–37)
MCV RBC AUTO: 72.2 FL (ref 80–94)
MONOCYTES # BLD: 0.8 K/UL (ref 0–0.8)
MONOCYTES NFR BLD: 7.9 % (ref 0–10)
NRBC BLD AUTO-RTO: 0.2 % (ref 0–2)
PLATELET # BLD: 579 K/UL (ref 130–400)
PMV BLD AUTO: 7.6 FL (ref 7.2–11.7)
POTASSIUM SERPL-SCNC: 3.5 MMOL/L (ref 3.6–5.2)
SODIUM SERPL-SCNC: 138 MMOL/L (ref 132–148)
WBC # BLD AUTO: 10.5 K/UL (ref 4.8–10.8)

## 2017-12-05 RX ADMIN — TAZOBACTAM SODIUM AND PIPERACILLIN SODIUM SCH MLS/HR: 375; 3 INJECTION, SOLUTION INTRAVENOUS at 16:31

## 2017-12-05 RX ADMIN — WATER SCH MLS/HR: 1 INJECTION INTRAMUSCULAR; INTRAVENOUS; SUBCUTANEOUS at 00:39

## 2017-12-05 RX ADMIN — PANTOPRAZOLE SODIUM SCH MG: 40 TABLET, DELAYED RELEASE ORAL at 09:31

## 2017-12-05 RX ADMIN — TAZOBACTAM SODIUM AND PIPERACILLIN SODIUM SCH MLS/HR: 375; 3 INJECTION, SOLUTION INTRAVENOUS at 02:03

## 2017-12-05 RX ADMIN — WATER SCH MLS/HR: 1 INJECTION INTRAMUSCULAR; INTRAVENOUS; SUBCUTANEOUS at 15:32

## 2017-12-05 RX ADMIN — WATER SCH MLS/HR: 1 INJECTION INTRAMUSCULAR; INTRAVENOUS; SUBCUTANEOUS at 06:09

## 2017-12-05 RX ADMIN — TAZOBACTAM SODIUM AND PIPERACILLIN SODIUM SCH MLS/HR: 375; 3 INJECTION, SOLUTION INTRAVENOUS at 09:32

## 2017-12-05 NOTE — CP.PCM.PN
Subjective





- Date & Time of Evaluation


Date of Evaluation: 12/04/17


Time of Evaluation: 16:30





- Subjective


Subjective: 





PT SEEN WITH DR. SHEIKH ON ROUNDS.  ALSO SEEN WITH DR. ERAZO DURING HER ROUNDS.  

PT COMPLAINING OF LEFT AND RIGHT SIDED LATERAL AND POSTERIOR RIB PAIN UPON 

COUGHING.  ALSO ADMITS THAT HE FEELS SHARP "KNIFE-LIKE" PAIN UPON DEEP 

INSPIRATION.  OTHER THAN THIS PT IS IN GOOD SPIRITS.  EXAMINED BY DR. ERAZO.  

PER HER RECOMMENDATIONS ORDER A STAT CXR PA/LAT TO R/O POSSIBILITY OF 

PNEUMONIA.  WILL CONTINUE IV ABX.  PENDING CXR PT MAY BE D/C HOME TOMORROW WITH 

PO ABX.  NP WILL F/U TOMORROW.  NO FURTHER ORDERS. 





Objective





- Vital Signs/Intake and Output


Vital Signs (last 24 hours): 


 











Temp Pulse Resp BP Pulse Ox


 


 97.5 F L  83   20   152/69 H  96 


 


 12/05/17 08:49  12/05/17 08:49  12/05/17 08:49  12/05/17 08:49  12/05/17 08:49








Intake and Output: 


 











 12/05/17 12/05/17





 06:59 18:59


 


Intake Total  550


 


Output Total 750 400


 


Balance -750 150














- Medications


Medications: 


 Current Medications





Acetaminophen (Tylenol 325mg Tab)  650 mg PO Q6 PRN


   PRN Reason: Temperature


   Last Admin: 12/02/17 16:42 Dose:  650 mg


Albuterol/Ipratropium (Duoneb 3 Mg/0.5 Mg (3 Ml) Ud)  3 ml INH RQ2 PRN


   PRN Reason: Shortness of Breath


Amlodipine Besylate (Norvasc)  5 mg PO DAILY [facility]


   Last Admin: 12/05/17 09:31 Dose:  5 mg


Aspirin (Ecotrin)  81 mg PO DAILY [facility]


   Last Admin: 12/05/17 09:31 Dose:  81 mg


Docusate Sodium (Colace)  100 mg PO BID [facility]


   Last Admin: 12/05/17 09:31 Dose:  100 mg


Fenofibrate (Tricor)  145 mg PO DAILY [facility]


   Last Admin: 12/05/17 09:31 Dose:  145 mg


Heparin Sodium (Porcine) (Heparin)  5,000 units SC Q12 [facility]


   Last Admin: 12/05/17 09:33 Dose:  5,000 units


Piperacillin Sod/Tazobactam Sod (Zosyn 3.375 Gm Iv Premix)  3.375 gm in 50 mls 

@ 100 mls/hr IVPB Q8H [facility]


   Last Admin: 12/05/17 09:32 Dose:  100 mls/hr


Metronidazole (Flagyl)  500 mg in 100 mls @ 100 mls/hr IVPB Q8H [facility]


   Last Admin: 12/05/17 06:09 Dose:  100 mls/hr


Ondansetron HCl (Zofran Tab)  4 mg PO Q6 PRN


   PRN Reason: Nausea/Vomiting


Pantoprazole Sodium (Protonix Ec Tab)  40 mg PO DAILY [facility]


   Last Admin: 12/05/17 09:31 Dose:  40 mg


Rosuvastatin Calcium (Crestor)  10 mg PO HS [facility]


   Last Admin: 12/04/17 21:17 Dose:  10 mg











- Labs


Labs: 


 





 12/04/17 06:24 





 12/04/17 06:24 





 











PT  14.4 SECONDS (9.7-12.2)  H  11/24/17  11:15    


 


INR  1.3   11/24/17  11:15    


 


APTT  32 SECONDS (21-34)   11/24/17  11:15

## 2017-12-05 NOTE — CP.PCM.PN
Subjective





- Date & Time of Evaluation


Date of Evaluation: 12/05/17


Time of Evaluation: 16:45





- Subjective


Subjective: 





PT CLEARED FOR D/C BY DR. AZAEL ERAZO.  PER DR. CASTRO BERRY DC PT WITH PO AUGMENTIN 

875 MG BID X5 DAYS.  NP ALSO DISCUSSED CASE WITH DR. DARRELL LINDSEY PT CLEARED 

FOR D/C PER HIS PERSPECTIVE; TO F/U IN HIS OFFICE NEXT WEEK FOR STAPLE REMOVAL.

  DR. SHEIKH MADE AWARE OF PLAN AND OK TO D/C TODAY.  PT ENCOURAGED TO DO INC 

SPIR QHOUR.  RX SENT TO HIS PHARMACY.  RX FOR HOME HEALTH AIDE EVAL GIVEN TO 

 FOR THE PT PER HIS REQUEST.  ALL D/C, MEDS, AND F/U INFORMATION 

DISCUSSED WITH PT AND HE VERBALIZES UNDERSTANDING OF ALL.  





Objective





- Vital Signs/Intake and Output


Vital Signs (last 24 hours): 


 











Temp Pulse Resp BP Pulse Ox


 


 98.6 F   86   18   126/70   98 


 


 12/05/17 15:49  12/05/17 15:49  12/05/17 15:49  12/05/17 15:49  12/05/17 15:49








Intake and Output: 


 











 12/05/17 12/05/17





 06:59 18:59


 


Intake Total  550


 


Output Total 750 400


 


Balance -750 150














- Medications


Medications: 


 Current Medications





Acetaminophen (Tylenol 325mg Tab)  650 mg PO Q6 PRN


   PRN Reason: Temperature


   Last Admin: 12/02/17 16:42 Dose:  650 mg


Albuterol/Ipratropium (Duoneb 3 Mg/0.5 Mg (3 Ml) Ud)  3 ml INH RQ2 PRN


   PRN Reason: Shortness of Breath


Amlodipine Besylate (Norvasc)  5 mg PO DAILY [facility]


   Last Admin: 12/05/17 09:31 Dose:  5 mg


Aspirin (Ecotrin)  81 mg PO DAILY [facility]


   Last Admin: 12/05/17 09:31 Dose:  81 mg


Docusate Sodium (Colace)  100 mg PO BID [facility]


   Last Admin: 12/05/17 16:32 Dose:  100 mg


Fenofibrate (Tricor)  145 mg PO DAILY [facility]


   Last Admin: 12/05/17 09:31 Dose:  145 mg


Heparin Sodium (Porcine) (Heparin)  5,000 units SC Q12 [facility]


   Last Admin: 12/05/17 09:33 Dose:  5,000 units


Piperacillin Sod/Tazobactam Sod (Zosyn 3.375 Gm Iv Premix)  3.375 gm in 50 mls 

@ 100 mls/hr IVPB Q8H [facility]


   Last Admin: 12/05/17 16:31 Dose:  100 mls/hr


Metronidazole (Flagyl)  500 mg in 100 mls @ 100 mls/hr IVPB Q8H [facility]


   Last Admin: 12/05/17 15:32 Dose:  100 mls/hr


Ondansetron HCl (Zofran Tab)  4 mg PO Q6 PRN


   PRN Reason: Nausea/Vomiting


Pantoprazole Sodium (Protonix Ec Tab)  40 mg PO DAILY [facility]


   Last Admin: 12/05/17 09:31 Dose:  40 mg


Rosuvastatin Calcium (Crestor)  10 mg PO HS [facility]


   Last Admin: 12/04/17 21:17 Dose:  10 mg











- Labs


Labs: 


 





 12/05/17 14:06 





 12/05/17 14:06 





 











PT  14.4 SECONDS (9.7-12.2)  H  11/24/17  11:15    


 


INR  1.3   11/24/17  11:15    


 


APTT  32 SECONDS (21-34)   11/24/17  11:15

## 2017-12-05 NOTE — PN
DATE:  12/04/2017



SUBJECTIVE:  Patient is seen today, 12/04/2017.  He still has abdominal

pain at the site of the surgery.  Patient moved bowel twice today.



PHYSICAL EXAMINATION:

VITAL SIGNS:  Blood pressure 144/70, temperature 98.5, respiratory rate 20,

and pulse 82.

HEENT:  Pupils equal, reactive to light.  Normal-appearing mucosa of the

conjunctivae, oropharynx, and nasal membrane mucosa.

NECK:  Supple.  No JVD.  No carotid bruit.  No lymph node.  No thyromegaly.

CHEST AND LUNGS:  Bilateral symmetrical expansion.  Good air exchange.  No

rales.  No rhonchi.

CARDIOVASCULAR SYSTEM:  PMI not localized.  S1 and S2.  No additional

sounds.

ABDOMEN:  Normoactive bowel sounds.  No tenderness.  No organomegaly.  No

masses.

EXTREMITIES:  No cyanosis, no clubbing, no edema.

CENTRAL NERVOUS SYSTEM:  Alert, awake, oriented x2.  No neurological

deficit could be appreciated.



ASSESSMENT:

1.  Postoperative, status post open cholecystectomy with Streptococcus

viridans infected gallbladder.

2.  Status post small-bowel obstruction.

3.  Hypertension.

4.  Hypercholesterolemia.



PLAN:  Continue current antibiotics and follow recommendations of ID. 

Monitor electrolytes.  Follow surgical recommendations.





__________________________________________

Gem Lewis MD



DD:  12/04/2017 20:02:46

DT:  12/04/2017 20:05:24

Job # [id number]

## 2017-12-05 NOTE — CT
PROCEDURE:  CT Chest with contrast (Pulmonary Angiogram)



HISTORY:

Chest pain.



COMPARISON:

None available. 



TECHNIQUE:

Axial computed tomography images were obtained of the chest in the 

pulmonary arterial phase of enhancement. Coronal and sagittal 

reformatted images were created and reviewed.



Intravenous contrast dose: 100 ml Visipaque 320



Radiation dose:



Total exam DLP = 409.67 MGy-cm.



This CT exam was performed using one or more of the following dose 

reduction techniques: Automated exposure control, adjustment of the 

mA and/or kV according to patient size, and/or use of iterative 

reconstruction technique.



FINDINGS:



PULMONARY ARTERIES:

Unremarkable. No pulmonary embolism. 



AORTA:

No acute findings. No thoracic aortic aneurysm. 



LUNGS:

Bilateral atelectasis posterior basal segments.  No nodules or mass. 



PLEURAL SPACES:

Unremarkable. No effusion or pneuomothorax. 



HEART:

Unremarkable. No cardiomegaly. No significant pericardial effusion. 



LYMPH NODES:

No lymphadenopathy.



BONES, CHEST WALL:

Unremarkable. No fracture or destructive lesion 



OTHER FINDINGS:

There is a large hiatal hernia. 



IMPRESSION:

No pulmonary embolus.



Bilateral atelectasis.

## 2017-12-05 NOTE — CP.PCM.PN
Subjective





- Date & Time of Evaluation


Date of Evaluation: 12/05/17


Time of Evaluation: 12:02





- Subjective


Subjective: 





afebrile


Feeling better today.


States pain is improving bilaterally lower chest





LABS NOTED;


D-DIMER ELEVATED / SEC TO SURGERY/INFECTION





CT ANGIO CHEST-VE fOR PULMONARY EMBOLISM.


BILATERAL ATELECTASIS.





CASE DISCUSSED WITH MS STEPHEN WHITTAKER.


PATIENT ANXIOUS TO GO HOME.


SWITCHED TO BY MOUTH AUGMENTIN 875MG  TWICE A DAY X 5 DAYS.








Objective





- Vital Signs/Intake and Output


Vital Signs (last 24 hours): 


 











Temp Pulse Resp BP Pulse Ox


 


 97.5 F L  83   20   152/69 H  96 


 


 12/05/17 08:49  12/05/17 08:49  12/05/17 08:49  12/05/17 08:49  12/05/17 08:49








Intake and Output: 


 











 12/05/17 12/05/17





 06:59 18:59


 


Intake Total  550


 


Output Total 750 400


 


Balance -750 150














- Medications


Medications: 


 Current Medications





Acetaminophen (Tylenol 325mg Tab)  650 mg PO Q6 PRN


   PRN Reason: Temperature


   Last Admin: 12/02/17 16:42 Dose:  650 mg


Albuterol/Ipratropium (Duoneb 3 Mg/0.5 Mg (3 Ml) Ud)  3 ml INH RQ2 PRN


   PRN Reason: Shortness of Breath


Amlodipine Besylate (Norvasc)  5 mg PO DAILY [facility]


   Last Admin: 12/05/17 09:31 Dose:  5 mg


Aspirin (Ecotrin)  81 mg PO DAILY [facility]


   Last Admin: 12/05/17 09:31 Dose:  81 mg


Docusate Sodium (Colace)  100 mg PO BID [facility]


   Last Admin: 12/05/17 09:31 Dose:  100 mg


Fenofibrate (Tricor)  145 mg PO DAILY [facility]


   Last Admin: 12/05/17 09:31 Dose:  145 mg


Heparin Sodium (Porcine) (Heparin)  5,000 units SC Q12 [facility]


   Last Admin: 12/05/17 09:33 Dose:  5,000 units


Piperacillin Sod/Tazobactam Sod (Zosyn 3.375 Gm Iv Premix)  3.375 gm in 50 mls 

@ 100 mls/hr IVPB Q8H [facility]


   Last Admin: 12/05/17 09:32 Dose:  100 mls/hr


Metronidazole (Flagyl)  500 mg in 100 mls @ 100 mls/hr IVPB Q8H [facility]


   Last Admin: 12/05/17 06:09 Dose:  100 mls/hr


Ondansetron HCl (Zofran Tab)  4 mg PO Q6 PRN


   PRN Reason: Nausea/Vomiting


Pantoprazole Sodium (Protonix Ec Tab)  40 mg PO DAILY [facility]


   Last Admin: 12/05/17 09:31 Dose:  40 mg


Rosuvastatin Calcium (Crestor)  10 mg PO HS [facility]


   Last Admin: 12/04/17 21:17 Dose:  10 mg











- Labs


Labs: 


 





 12/04/17 06:24 





 12/04/17 06:24 





 











PT  14.4 SECONDS (9.7-12.2)  H  11/24/17  11:15    


 


INR  1.3   11/24/17  11:15    


 


APTT  32 SECONDS (21-34)   11/24/17  11:15    














- Constitutional


Appears: No Acute Distress





- Head Exam


Head Exam: NORMAL INSPECTION





- Eye Exam


Eye Exam: EOMI, PERRL





- ENT Exam


ENT Exam: Normal Oropharynx





- Respiratory Exam


Respiratory Exam: Decreased Breath Sounds (BASES BILATERALLY.), NORMAL 

BREATHING PATTERN





- Cardiovascular Exam


Cardiovascular Exam: REGULAR RHYTHM, +S1, +S2





- GI/Abdominal Exam


GI & Abdominal Exam: Soft, Tenderness (MILD TENDERNESS POSTOPERATIVE SITE.  

dRESSING DRY AND CLEAN.  sTAPLES IN PLACE.), Normal Bowel Sounds





- Extremities Exam


Extremities Exam: Normal Capillary Refill.  absent: Calf Tenderness





- Neurological Exam


Neurological Exam: Awake, CN II-XII Intact, Normal Gait, Oriented x3





- Psychiatric Exam


Psychiatric exam: Normal Mood





- Skin


Skin Exam: Normal Color, Warm





Assessment and Plan


(1) Acute gangrenous cholecystitis


Status: Acute   





(2) Status post cholecystectomy


Status: Acute   





(3) Abdominal pain


Status: Acute   





(4) Asthma


Status: Acute

## 2017-12-05 NOTE — CP.PCM.PN
Subjective





- Date & Time of Evaluation


Date of Evaluation: 12/05/17


Time of Evaluation: 08:49





- Subjective


Subjective: 





Gen Surgery:  Dr Rosenbaum





Pt S&E.  Pt reportedly had some sob and pain with inspiratio last nightn.  D-

dimer was ordered and pt sent for CT angio of the chest.  


D-dimer elevated but this is likely secondary to surgery.


CT prelimary read does not show any evidence PE - awaiting final read per 

radiologist.


Pt demonstrates no tachycardia or hypoxia.








Pt reports to me the pain is above his incision site, worse with movement and 

inspiration.  He states the pain makes it hard for him to take in deep 

breathes.  He states this pain is not anything new since the surgery, and he 

actually feels it has improved significantly.  He is tolerating diet, ambulating

, and having BMs.  Denies N/V, F/C.  Pt states he wants to go home.  





Objective





- Vital Signs/Intake and Output


Vital Signs (last 24 hours): 


 











Temp Pulse Resp BP Pulse Ox


 


 98.1 F   79   20   130/71   97 


 


 12/04/17 23:15  12/04/17 23:15  12/04/17 23:15  12/04/17 23:15  12/04/17 23:15








Intake and Output: 


 











 12/05/17 12/05/17





 06:59 18:59


 


Intake Total  550


 


Output Total 750 400


 


Balance -750 150














- Medications


Medications: 


 Current Medications





Acetaminophen (Tylenol 325mg Tab)  650 mg PO Q6 PRN


   PRN Reason: Temperature


   Last Admin: 12/02/17 16:42 Dose:  650 mg


Amlodipine Besylate (Norvasc)  5 mg PO DAILY [facility]


   Last Admin: 12/04/17 09:40 Dose:  5 mg


Aspirin (Ecotrin)  81 mg PO DAILY [facility]


   Last Admin: 12/04/17 09:40 Dose:  81 mg


Docusate Sodium (Colace)  100 mg PO BID [facility]


   Last Admin: 12/04/17 18:02 Dose:  100 mg


Fenofibrate (Tricor)  145 mg PO DAILY [facility]


   Last Admin: 12/04/17 10:52 Dose:  145 mg


Heparin Sodium (Porcine) (Heparin)  5,000 units SC Q12 [facility]


   Last Admin: 12/05/17 00:40 Dose:  5,000 units


Piperacillin Sod/Tazobactam Sod (Zosyn 3.375 Gm Iv Premix)  3.375 gm in 50 mls 

@ 100 mls/hr IVPB Q8H [facility]


   Last Admin: 12/05/17 02:03 Dose:  100 mls/hr


Metronidazole (Flagyl)  500 mg in 100 mls @ 100 mls/hr IVPB Q8H [facility]


   Last Admin: 12/05/17 06:09 Dose:  100 mls/hr


Ondansetron HCl (Zofran Tab)  4 mg PO Q6 PRN


   PRN Reason: Nausea/Vomiting


Pantoprazole Sodium (Protonix Ec Tab)  40 mg PO DAILY [facility]


   Last Admin: 12/04/17 09:40 Dose:  40 mg


Rosuvastatin Calcium (Crestor)  10 mg PO HS [facility]


   Last Admin: 12/04/17 21:17 Dose:  10 mg











- Labs


Labs: 


 





 12/04/17 06:24 





 12/04/17 06:24 





 











PT  14.4 SECONDS (9.7-12.2)  H  11/24/17  11:15    


 


INR  1.3   11/24/17  11:15    


 


APTT  32 SECONDS (21-34)   11/24/17  11:15    














- Constitutional


Appears: Non-toxic, No Acute Distress





- ENT Exam


ENT Exam: Mucous Membranes Moist





- Respiratory Exam


Respiratory Exam: Clear to Ausculation Bilateral.  absent: Accessory Muscle Use

, Respiratory Distress





- Cardiovascular Exam


Cardiovascular Exam: REGULAR RHYTHM.  absent: Tachycardia





- GI/Abdominal Exam


GI & Abdominal Exam: Soft, Tenderness (around incision).  absent: Distended


Additional comments: 





dressing c/d/i





- Neurological Exam


Neurological Exam: Alert, Awake, Oriented x3





- Psychiatric Exam


Psychiatric exam: Normal Affect, Normal Mood





- Skin


Skin Exam: Normal Color, Warm





Assessment and Plan





- Assessment and Plan (Free Text)


Assessment: 





82M s/p laparoscopic converted open cholecystectomy


Plan: 





Pt doing well post-op


wbc trending down


clear for d/c home from surgery pending final CT read





will d/w Dr Ilsa Torres, PGY3

## 2017-12-05 NOTE — CARD
--------------- APPROVED REPORT --------------





EKG Measurement

Heart [id number]

CT [id number]

[id number]

[id number]

[id number]



<Conclusion>

Normal sinus rhythm

Normal ECG

## 2017-12-07 NOTE — DS
REASON FOR ADMISSION:  This is an 82-year-old  male with history of

multiple admissions for small bowel obstruction, was admitted for abdominal

pain, thought to be due to obstruction.



HOSPITAL COURSE:  Patient was admitted to medical floor and he has his

obstruction symptom resolved, but patient stated that he continued to have

some abdominal pain and found to have leukocytosis.  Abdominal ultrasound

showed suggestion for cholecystitis and patient underwent HIDA scan that

showed cystic duct occlusion.  The patient had a surgical consult done by

Dr. Rosenbaum and patient underwent open cholecystectomy.  The patient was

found to have gangrenous gallbladder for which he was continued on

antibiotics and was discharged to home in a stable condition to continue

p.o. antibiotics for another 5 days and follow up with both Surgery and

primary care physician.



FINAL DIAGNOSES:

1.  Acute cholecystitis, status post cholecystectomy.

2.  Hypertensin.

3.  Hypercholesterolemia.

4.  Small bowel obstruction.





__________________________________________

Gem Lewis MD



DD:  12/05/2017 22:08:12

DT:  12/06/2017 1:14:13

Job # [id number]

## 2018-05-07 ENCOUNTER — HOSPITAL ENCOUNTER (INPATIENT)
Dept: HOSPITAL 31 - C.ER | Age: 83
LOS: 1 days | Discharge: ANOTHER HOSPITAL | DRG: 281 | End: 2018-05-08
Payer: COMMERCIAL

## 2018-05-07 VITALS — BODY MASS INDEX: 25.1 KG/M2

## 2018-05-07 DIAGNOSIS — I44.7: ICD-10-CM

## 2018-05-07 DIAGNOSIS — E03.9: ICD-10-CM

## 2018-05-07 DIAGNOSIS — Z87.891: ICD-10-CM

## 2018-05-07 DIAGNOSIS — K21.9: ICD-10-CM

## 2018-05-07 DIAGNOSIS — I21.4: Primary | ICD-10-CM

## 2018-05-07 DIAGNOSIS — Z90.49: ICD-10-CM

## 2018-05-07 DIAGNOSIS — I10: ICD-10-CM

## 2018-05-07 DIAGNOSIS — F10.11: ICD-10-CM

## 2018-05-07 DIAGNOSIS — I47.2: ICD-10-CM

## 2018-05-07 DIAGNOSIS — E78.00: ICD-10-CM

## 2018-05-07 LAB
ALBUMIN SERPL-MCNC: 3.9 G/DL (ref 3.5–5)
ALBUMIN/GLOB SERPL: 1.2 {RATIO} (ref 1–2.1)
ALT SERPL-CCNC: 23 U/L (ref 21–72)
APTT BLD: 32 SECONDS (ref 21–34)
AST SERPL-CCNC: 30 U/L (ref 17–59)
BASOPHILS # BLD AUTO: 0.1 K/UL (ref 0–0.2)
BASOPHILS NFR BLD: 1.6 % (ref 0–2)
BILIRUB UR-MCNC: NEGATIVE MG/DL
BNP SERPL-MCNC: 195 PG/ML (ref 0–900)
BUN SERPL-MCNC: 15 MG/DL (ref 9–20)
CALCIUM SERPL-MCNC: 8.8 MG/DL (ref 8.6–10.4)
CK MB SERPL-MCNC: 4.24 NG/ML (ref 0–3.38)
CK MB SERPL-MCNC: 5.28 NG/ML (ref 0–3.38)
EOSINOPHIL # BLD AUTO: 0.2 K/UL (ref 0–0.7)
EOSINOPHIL NFR BLD: 3.6 % (ref 0–4)
ERYTHROCYTE [DISTWIDTH] IN BLOOD BY AUTOMATED COUNT: 19.7 % (ref 11.5–14.5)
GFR NON-AFRICAN AMERICAN: 58
GLUCOSE UR STRIP-MCNC: NORMAL MG/DL
HGB BLD-MCNC: 9.9 G/DL (ref 12–18)
INR PPP: 1.1
LEUKOCYTE ESTERASE UR-ACNC: (no result) LEU/UL
LIPASE: 86 U/L (ref 23–300)
LYMPHOCYTES # BLD AUTO: 1.7 K/UL (ref 1–4.3)
LYMPHOCYTES NFR BLD AUTO: 25.6 % (ref 20–40)
MCH RBC QN AUTO: 21.5 PG (ref 27–31)
MCHC RBC AUTO-ENTMCNC: 31.9 G/DL (ref 33–37)
MCV RBC AUTO: 67.2 FL (ref 80–94)
MONOCYTES # BLD: 0.7 K/UL (ref 0–0.8)
MONOCYTES NFR BLD: 10.5 % (ref 0–10)
NEUTROPHILS # BLD: 3.8 K/UL (ref 1.8–7)
NEUTROPHILS NFR BLD AUTO: 58.7 % (ref 50–75)
NRBC BLD AUTO-RTO: 0.1 % (ref 0–2)
PH UR STRIP: 6 [PH] (ref 5–8)
PLATELET # BLD: 256 K/UL (ref 130–400)
PMV BLD AUTO: 9.3 FL (ref 7.2–11.7)
PROT UR STRIP-MCNC: NEGATIVE MG/DL
PROTHROMBIN TIME: 12 SECONDS (ref 9.7–12.2)
RBC # BLD AUTO: 4.61 MIL/UL (ref 4.4–5.9)
RBC # UR STRIP: NEGATIVE /UL
SP GR UR STRIP: 1.01 (ref 1–1.03)
SQUAMOUS EPITHIAL: < 1 /HPF (ref 0–5)
TROPONIN I SERPL-MCNC: 0.57 NG/ML (ref 0–0.12)
TROPONIN I SERPL-MCNC: 0.69 NG/ML (ref 0–0.12)
UROBILINOGEN UR-MCNC: NORMAL MG/DL (ref 0.2–1)
WBC # BLD AUTO: 6.5 K/UL (ref 4.8–10.8)

## 2018-05-07 NOTE — CARD
--------------- APPROVED REPORT --------------





EKG Measurement

Heart [id number]

HI [id number]

[id number]

[id number]

[id number]



<Conclusion>

Normal sinus rhythm

Left bundle branch block

Abnormal ECG

## 2018-05-07 NOTE — CP.PCM.PN
Subjective





- Date & Time of Evaluation


Date of Evaluation: 05/07/18


Time of Evaluation: 20:09





- Subjective


Subjective: 





Evaluated rhythm upon request of Dr. Arredondo, as reported as Vtach, which LBBB 

noticed which preceded by p wave, rate in 115/min, the shape of complex 

unchanged from the prior qrs complex on the monitor and prior ekg suggesting 

sinus origin rather ventricular with continued LBBB, patient was asymptomatic, 

cp reported only last night at time of presentation then resolved. Labs, meds 

reviewed, d/w Dr. Arredondo, patient anyway going to [facility] for cath for 

NSTEMI, which he could go from the same floor.  


Chest clear, neck supple, abd soft, ext no edema, skin normal turgor, CNS no 

focal deficit.





Objective





- Vital Signs/Intake and Output


Vital Signs (last 24 hours): 


 











Temp Pulse Resp BP Pulse Ox


 


 97.9 F   76   18   170/81 H  95 


 


 05/07/18 16:31  05/07/18 19:36  05/07/18 19:30  05/07/18 19:30  05/07/18 19:30








Intake and Output: 


 











 05/07/18 05/08/18





 18:59 06:59


 


Intake Total 400 


 


Output Total 180 200


 


Balance 220 -200














- Medications


Medications: 


 Current Medications





Aspirin (Ecotrin)  81 mg PO DAILY [facility]


   Last Admin: 05/07/18 12:54 Dose:  81 mg


Clopidogrel Bisulfate (Plavix)  75 mg PO DAILY [facility]


   Last Admin: 05/07/18 14:48 Dose:  75 mg


Enoxaparin Sodium (Lovenox)  60 mg SC BID [facility]


   Last Admin: 05/07/18 18:31 Dose:  60 mg


Loratadine (Claritin)  10 mg PO DAILY [facility]


   Last Admin: 05/07/18 12:54 Dose:  10 mg


Metoprolol Tartrate (Lopressor)  25 mg PO BID [facility]


   Last Admin: 05/07/18 17:37 Dose:  25 mg


Pantoprazole Sodium (Protonix Ec Tab)  40 mg PO DAILY [facility]


   Last Admin: 05/07/18 12:54 Dose:  40 mg


Prednisolone Acetate (Pred Forte 1% Opht Susp)  0 ml OS DAILY [facility]


   Last Admin: 05/07/18 13:34 Dose:  1 drop


Rosuvastatin Calcium (Crestor)  40 mg PO [facility]


Tramadol HCl (Ultram)  50 mg PO DAILY PRN


   PRN Reason: Pain, moderate (4-7)











- Labs


Labs: 


 





 05/07/18 01:12 





 05/07/18 01:12 





 











PT  12.0 SECONDS (9.7-12.2)   05/07/18  01:12    


 


INR  1.1   05/07/18  01:12    


 


APTT  32 SECONDS (21-34)   05/07/18  01:12

## 2018-05-07 NOTE — C.PDOC
History Of Present Illness


Patient presents to the ER via EMS with a complaint of chest pain that began a 

few hours PTA. Paramedics arrived to the scene and patient complained of 7/10 

pain he described as pressure. Patient was give 324 aspirin and 3 sublingual 

nitro enroute; on arrival patient no longer complains of chest pain.


Time Seen by Provider: 05/07/18 00:58


Chief Complaint (Nursing): Chest Pain


History Per: Patient


History/Exam Limitations: no limitations


Onset/Duration Of Symptoms: Hrs


Current Symptoms Are (Timing): Gone


Severity: Moderate


Pain Scale Rating Of: 7


Quality: Pressure


Associated Symptoms: denies: Nausea, Dyspnea, Diaphoresis, Syncope


Modifying Factors: None


Exacerbating Factors: None


Alleviating Factors: None


Nitro Therapy Administered: 3, Per EMS, Complete Relief


Recent travel outside of the [place]: No





Past Medical History


Reviewed: Historical Data, Nursing Documentation, Vital Signs


Vital Signs: 


 Last Vital Signs











Temp  98.9 F   05/07/18 00:57


 


Pulse  94 H  05/07/18 01:32


 


Resp  18   05/07/18 01:32


 


BP  133/65   05/07/18 01:32


 


Pulse Ox  94 L  05/07/18 01:35














- Medical History


PMH: Asthma, Bronchitis, HTN, Hypercholesterolemia, Pneumonia (2011)


Surgical History: Appendectomy, Endoscopy, Hernia Repair





- [facility] Procedures








INSPECTION OF GALLBLADDER, PERCUTANEOUS ENDOSCOPIC APPROACH (11/26/17)


INSPECTION OF LOWER INTESTINAL TRACT, ENDO (09/25/16)


RADIOGRAPHY OF GALLBLADDER & BILE DUCT USING OTH CONTRAST (11/26/17)


RESECTION OF GALLBLADDER, OPEN APPROACH (11/26/17)








Family History: States: No Known Family Hx





- Social History


Hx Tobacco Use: No


Hx Alcohol Use: No


Hx Substance Use: No





- Immunization History


Hx Tetanus Toxoid Vaccination: Yes


Hx Influenza Vaccination: Yes


Hx Pneumococcal Vaccination: Yes





Review Of Systems


Constitutional: Negative for: Fever, Chills


Eyes: Negative for: Vision Change


Cardiovascular: Positive for: Chest Pain.  Negative for: Palpitations


Respiratory: Negative for: Cough, Shortness of Breath


Gastrointestinal: Negative for: Nausea, Vomiting


Genitourinary: Negative for: Dysuria, Hematuria


Musculoskeletal: Negative for: Neck Pain


Skin: Negative for: Rash


Neurological: Negative for: Weakness, Numbness





Physical Exam





- Physical Exam


Appears: Non-toxic, Other (Awake, alert)


Skin: Warm, Dry


Head: Normacephalic


Eye(s): bilateral: Normal Inspection


Oral Mucosa: Moist


Neck: Trachea Midline, No Midline Cervical Tenderness, No Paracervical 

Tenderness, No Step Off Deformity, Supple


Lymphatic: No Adenopathy


Chest: Symmetrical, No Tenderness


Cardiovascular: Rhythm Regular


Respiratory: No Rales, No Rhonchi, No Wheezing


Gastrointestinal/Abdominal: Soft, No Tenderness, No Distention, Other (RUQ scar)


Back: No CVA Tenderness


Neurological/Psych: Oriented x3, Other (No focal deficits)





ED Course And Treatment





- Laboratory Results


Result Diagrams: 


 05/07/18 01:12





 05/07/18 01:12


ECG: Interpreted By Me, Viewed By Me


ECG Rhythm: Sinus Rhythm (98), L BBB, Nonspecific Changes (changed from 11/27/17

)


O2 Sat by Pulse Oximetry: 94


Pulse Ox Interpretation: Normal





- Radiology


CXR: Interpreted by Me, Viewed By Me


CXR Interpretation: Yes: Cardiomegaly, Other (mild chf).  No: Infiltrates, 

Fracture


Progress Note: EKG, blood work, CXR, and urinalysis ordered.  12:44AM sent ekg 

from original tracing to dr kilpatrick.  12:49 sent ekg from the ed and previous 

one. As pt is cp free, does not meet criteria of code heart as per Dr Kilpatrick.  1 

am vitals stable, still cp free





Critical Care Time





- Critical Care Note


Total Time (in mins): 30


Documented critical care: time excludes all time spent performing seperately 

billable procedures.





Disposition


Discussed With : Gem Lewis


Comment: accepted the pt on his service and took over the care at 1:59AM


Doctor Will See Patient In The: Hospital


Counseled Patient/Family Regarding: Studies Performed, Diagnosis





- Disposition


Referrals: 


Gem Lewis MD [Primary Care Provider] - 


Disposition: HOSPITALIZED


Disposition Time: 00:58


Condition: GUARDED


Forms:  [facility] (English)





- Clinical Impression


Clinical Impression: 


 Chest pain








- Scribe Statement


The provider has reviewed the documentation as recorded by the Scribe





Tomas Gaming





All medical record entries made by the Scribe were at my direction and 

personally dictated by me. I have reviewed the chart and agree that the record 

accurately reflects my personal performance of the history, physical exam, 

medical decision making, and the department course for this patient. I have 

also personally directed, reviewed, and agree with the discharge instructions 

and disposition.





Decision To Admit





- Pt Status Changed To:


Hospital Disposition Of: Inpatient





- Admit Certification


Admit to Inpatient:: After my assessment, the patient will require 

hospitalization for at least two midnights.  This is because of the severity of 

symptoms shown, intensity of services needed, and/or the medical risk in this 

patient being treated as an outpatient.





- InPatient:


Physician Admission Certification: I certify that this patient requires 2 or 

more midnights of care for the following reason:: After my assessment, the 

patient will require hospitalization for at least two midnights.  This is 

because of the severity of symptoms shown, intensity of services needed, and/or 

the medical risk in this patient being treated as an outpatient.





- .


Bed Request Type: Telemetry


Admitting Physician: Gem Lewis


Patient Diagnosis: 


 Chest pain

## 2018-05-07 NOTE — RAD
Chest x-ray single frontal view 



History: Chest pain. 



Comparison: 12/04/2017 



Findings: 



Moderate venous congestion with right hilar prominence. 



Cardiomegaly. 



Calcification at the aortic knob. 



Degenerative changes in the spine and shoulders. Productive bone 

formation at the undersurface of the distal right clavicle. 



Surgical clips in the right upper abdomen. 



Impression: 



Moderate venous congestion with right hilar prominence. 



Cardiomegaly. 



Calcification at the aortic knob. 



Degenerative changes in the spine and shoulders. Productive bone 

formation at the undersurface of the distal right clavicle. 



Surgical clips in the right upper abdomen.

## 2018-05-08 ENCOUNTER — HOSPITAL ENCOUNTER (OUTPATIENT)
Dept: HOSPITAL 42 - CATH | Age: 83
Discharge: ANOTHER HOSPITAL | End: 2018-05-08
Attending: INTERNAL MEDICINE
Payer: COMMERCIAL

## 2018-05-08 VITALS
SYSTOLIC BLOOD PRESSURE: 169 MMHG | DIASTOLIC BLOOD PRESSURE: 84 MMHG | HEART RATE: 80 BPM | OXYGEN SATURATION: 99 % | TEMPERATURE: 98 F | RESPIRATION RATE: 20 BRPM

## 2018-05-08 VITALS
DIASTOLIC BLOOD PRESSURE: 77 MMHG | RESPIRATION RATE: 18 BRPM | HEART RATE: 72 BPM | TEMPERATURE: 98.2 F | SYSTOLIC BLOOD PRESSURE: 154 MMHG

## 2018-05-08 VITALS — BODY MASS INDEX: 25.1 KG/M2

## 2018-05-08 DIAGNOSIS — I25.10: Primary | ICD-10-CM

## 2018-05-08 DIAGNOSIS — I25.2: ICD-10-CM

## 2018-05-08 DIAGNOSIS — I10: ICD-10-CM

## 2018-05-08 LAB
ALBUMIN SERPL-MCNC: 3.6 G/DL (ref 3.5–5)
ALBUMIN/GLOB SERPL: 1 {RATIO} (ref 1–2.1)
ALT SERPL-CCNC: 29 U/L (ref 21–72)
AST SERPL-CCNC: 35 U/L (ref 17–59)
BASOPHILS # BLD AUTO: 0 K/UL (ref 0–0.2)
BASOPHILS NFR BLD: 0.5 % (ref 0–2)
BUN SERPL-MCNC: 13 MG/DL (ref 9–20)
CALCIUM SERPL-MCNC: 8.7 MG/DL (ref 8.6–10.4)
EOSINOPHIL # BLD AUTO: 0.1 K/UL (ref 0–0.7)
EOSINOPHIL NFR BLD: 3.1 % (ref 0–4)
ERYTHROCYTE [DISTWIDTH] IN BLOOD BY AUTOMATED COUNT: 19.6 % (ref 11.5–14.5)
GFR NON-AFRICAN AMERICAN: 58
HGB BLD-MCNC: 9.9 G/DL (ref 12–18)
LYMPHOCYTES # BLD AUTO: 1.1 K/UL (ref 1–4.3)
LYMPHOCYTES NFR BLD AUTO: 22.9 % (ref 20–40)
MCH RBC QN AUTO: 22 PG (ref 27–31)
MCHC RBC AUTO-ENTMCNC: 33.1 G/DL (ref 33–37)
MCV RBC AUTO: 66.4 FL (ref 80–94)
MONOCYTES # BLD: 0.6 K/UL (ref 0–0.8)
MONOCYTES NFR BLD: 12.9 % (ref 0–10)
NEUTROPHILS # BLD: 2.8 K/UL (ref 1.8–7)
NEUTROPHILS NFR BLD AUTO: 60.6 % (ref 50–75)
NRBC BLD AUTO-RTO: 0.1 % (ref 0–2)
PLATELET # BLD: 251 K/UL (ref 130–400)
PMV BLD AUTO: 9.1 FL (ref 7.2–11.7)
RBC # BLD AUTO: 4.52 MIL/UL (ref 4.4–5.9)
WBC # BLD AUTO: 4.6 K/UL (ref 4.8–10.8)

## 2018-05-08 PROCEDURE — 99152 MOD SED SAME PHYS/QHP 5/>YRS: CPT

## 2018-05-08 PROCEDURE — 99153 MOD SED SAME PHYS/QHP EA: CPT

## 2018-05-08 PROCEDURE — 93458 L HRT ARTERY/VENTRICLE ANGIO: CPT

## 2018-05-08 NOTE — PN
DATE:  05/08/2018



SUBJECTIVE:  The patient was transferred to [facility] this morning,

underwent cardiac catheterization by Dr. Vizcarra which revealed severe

3-vessel coronary artery disease that is not amenable for intervention. 

The patient is currently in telemetry unit.  He denies any chest pain.  No

bleeding.



PHYSICAL EXAMINATION

VITAL SIGNS:  Stable.

HEENT:  Normocephalic.

CHEST:  Clear.

HEART:  S1 and S2 irregular.

EXTREMITIES:  No edema.



ASSESSMENT AND PLAN:

1.  Severe 3-vessel coronary artery disease.

2.  Status post myocardial infarction.

3.   Hypertension.



RECOMMENDATIONS:  Resume aspirin and Crestor.  Resume subcutaneous Lovenox.

Discontinue Plavix.  The plan is to transfer the patient by Dr. Vizcarra to

cardiothoracic surgeon at [facility] for coronary artery bypass

surgery.



__________________________________________

Alex Kennedy MD







DD:  05/08/2018 10:39:51

DT:  05/08/2018 12:14:12

Job # [id number]

## 2018-05-08 NOTE — PN
DATE:   ____



It was reported to me by the nurse that the patient's third enzyme is more

elevated at 0.687 and it is trending up.  I was also notified that the

patient has recurrent nonsustained ventricular tachycardia.  The patient

was earlier scheduled for cardiac catheterization for tomorrow after

discussing the procedure with the patient's sister and the patient's

nephew; however, in view of these findings and the fact that the patient

has baseline left bundle branch block, I discussed the case with the

primary physician, Dr. Lewis, and an urgent cardiac catheterization was

advised with the possibility of intervention to be performed at [facility].  Dr. Cartwright was contacted, and he is able to accept the patient

and do the procedure on an urgent basis.  I did contact the family at the

two available numbers on the chart.  The first number, [phone], which

is the patient's sister Hillary Bella, and I left the message; and the other

available number [phone] did not accept the messages.  The patient

himself is capable of making his own decision and at this point, we will

proceed with the transfer to [facility].





__________________________________________

Adriano Wallis MD





DD:  05/07/2018 20:23:05

DT:  05/07/2018 21:15:43

Job # [id number]

## 2018-05-08 NOTE — CARDCATH
PROCEDURE DATE:  05/08/2018



INDICATION:  Shaq Cook is an 83-year-old male who presented to

[facility] with non-ST-elevation MI.  The patient was brought to

[facility] for further evaluation and treatment for non-ST-elevation

MI.



PROCEDURE PERFORMED:  Left heart catheterization with selective left and

right coronary angiogram via left distal radial approach, 6-Guyanese distal

radial arterial access in the left distal radial artery, wrist band for

hemostasis.



TECHNIQUES OF PROCEDURE:  After obtaining informed consent, the patient was

brought to the cardiac cath suite in post-absorptive, non-sedated state. 

The patient was prepped and draped in the usual sterile fashion.  A 2%

lidocaine was used for infiltration of anesthesia.  Using modified

Seldinger technique, a 6-Guyanese left distal radial arterial access was

obtained.  Subsequently, under fluoroscopic guidance, J-wire was advanced. 

A JR4 diagnostic catheter used to engage the right coronary system.  

Angiograms were obtained in different orthogonal views.  Subsequently, over

exchange length J-wife, JR4 was exchanged with a JL4 diagnostic catheter. 

Angiograms of the left coronary system were obtained in different

orthogonal views.  Subsequently, the LV gram was obtained in the FLORENTINO view,

hemodynamics were obtained and pullback gradients were noted.



HEMODYNAMIC FINDINGS:  Left ventricular end-diastolic pressure was 18 mmHg.

There was no gradient noted upon the aortic valve pullback.  No AI, no MR. 

Left ventricular ejection fraction estimated to be 60-65%.



CORONARY ANATOMY:  The left main, large-sized vessel has a distal 40%

stenosis that bifurcates into left anterior descending, ramus intermedius

and left circumflex coronary artery.  Left anterior descending is a

large-sized vessel, has a proximal high-grade 95% stenosis and mid and

distal 85 and 90 stenosis, gives off 2 medium-sized diagonal branches,

which have diffuse 50% stenosis.  Left circumflex runs in the AV grove has

a proximal 90% stenosis, given off the obtuse marginal branches, which have

diffuse 60-70% stenosis.  Ramus intermedius diffusely diseased, 99%

stenosis.  RCA has a proximal high-grade diffuse 95% stenosis up to the mid

segment and a distal 90% stenosis.  Ostial PDA has 90% stenosis.



IMPRESSION:  Severe triple vessel disease, normal left ventricular ejection

fraction.



RECOMMENDATIONS:  The patient is to be transferred emergently to [place]

for an emergent CABG by Dr. Chuy Perla and Dr. Ortiz Smith.





__________________________________________

Lester Vizcarra MD



cc:  Alex Kennedy MD, Sahara Li MD.



DD:  05/08/2018 9:51:00

DT:  05/08/2018 11:39:13

Job # [id number]

## 2018-05-08 NOTE — CON
DATE:



REASON FOR CONSULTATION:  Chest pain.



HISTORY OF PRESENT ILLNESS:  The patient is an 83-year-old  male

who has known prior cardiac history according to him, presented because of

chest pain and exertional dyspnea as well as periods of palpitation.  The

history was obtained by , who happened to be the EKG

technician at the bedside.  At the moment of my history, the patient was

chest pain free.



SOCIAL HISTORY:  Nonsmoker, nondrinker.



MEDICATIONS:  The patient's home medications include Crestor 10 mg once a

day, tramadol 50 mg daily, omeprazole 40 mg once a day, Claritin 10 mg once

a day, aspirin 81 mg once a day, and Protonix 40 mg twice a day.



REVIEW OF SYSTEMS:  No fever or chills.  No dizziness or syncope.  No

productive cough.



PHYSICAL EXAMINATION:

GENERAL:  The patient is an elderly male who does not appear to be in any

distress.

VITAL SIGNS:  Blood pressure 172/78, heart rate 85, temperature 97.5,

respirations 20.

HEENT:  Normocephalic.

NECK:  No JVD.

CHEST:  Clear.

HEART:  S1 and S2, regular.

ABDOMEN:  Soft.

EXTREMITIES:  No edema.



LABORATORY DATA:  SMA-7:  Sodium 140, potassium 4, chloride 107, CO2 of 22,

glucose 104, BUN 15, creatinine 1.2.  Troponins were as follows 0.059 and

0.573.  Lipase is within normal limits.  PT, PTT, and INR are within normal

limits.  Hemoglobin and hematocrit 9.9 and 30.9.  White count and platelet

count are within normal limits.  EKG reveals sinus rhythm, with left bundle

branch block.  Chest x-ray revealed borderline cardiomegaly, prominent

central vasculature.



ASSESSMENT:

1.  Chest pain, consider a small myocardial infraction.

2.  Left bundle branch block which is a new finding.  Most recent EKG was

in November of last year which was normal sinus rhythm.

3.  Anemia with history of stool occult positive in 09/2016.



RECOMMENDATIONS:  Continue current aspirin 81 mg once a day, increase

Lovenox to _____ 60 mg twice a day.  Start Plavix 75 mg one a day. 

Continue oral Protonix.  Continue aspirin 81 mg once a day.  Start Crestor

20 mg once a day, Lopressor 25 mg twice a day.  Cardiac catheterization was

recommended.  The patient will be scheduled for 12 noon tomorrow.





__________________________________________

Adriano Wallis MD





DD:  05/07/2018 13:53:28

DT:  05/07/2018 20:40:50

Job # [id number]

## 2018-05-08 NOTE — CARD
--------------- APPROVED REPORT --------------





EKG Measurement

Heart [id number]

NV [id number]

[id number]

[id number]

[id number]



<Conclusion>

Normal sinus rhythm

Cannot rule out Anterior infarct, age undetermined

Prolonged QT

Abnormal ECG

## 2018-05-08 NOTE — HP
HISTORY OF PRESENT ILLNESS:  This is an 83-year-old  male with

history of multiple medical problems presented to emergency room with chest

pain.  The patient stated that he went to sleep, and he woke up with

pressure-like severe chest pain, 10/10.  The patient called 911.  The

patient was given aspirin and nitroglycerin on his way to the hospital. 

The patient was found to have elevated troponin of 0.57 and elevated CPK,

CK-MB of 5.28.  The patient was treated in the emergency room and admitted

for further management.  Cardiology consultation was called also for

further management.  Other review of system is negative.



ALLERGIES:  POSITIVE FOR DIPHENHYDRAMINE.



MEDICATIONS:  As per MAR.



PAST MEDICAL HISTORY:  Hypertension, status post partial colectomy, status

post cholecystectomy, hypercholesterolemia.



SOCIAL HISTORY:  Ex-smoker, ex-ETOH abuse, and no other substance abuse.



FAMILY HISTORY:  Not contributory.



PHYSICAL EXAMINATION:

GENERAL:  The patient is in bed, not in any cardiopulmonary distress at the

time of this examination.

VITAL SIGNS:  Blood pressure 171/85, temperature 97.9, respiratory rate 20,

and pulse 84.

HEENT:  Pupils equal and reactive to light.  Normal-appearing mucosa of the

conjunctivae, oropharynx, and nasal membrane mucosa.

NECK:  Supple.  No JVD.  No carotid bruit.  No lymph node.  No thyromegaly.

CHEST AND LUNGS:  Bilateral symmetrical expansion.  Good air exchange.  No

rales, no rhonchi.

CARDIOVASCULAR SYSTEM:  PMI not localized.  S1, S2.  No additional sounds.

ABDOMEN:   Normoactive bowel sounds.  No tenderness.  No organomegaly.  No

masses.

EXTREMITIES:   No cyanosis, no clubbing, no edema.

CNS:  Alert, awake, oriented x2.  No neurological deficit could be

appreciated.



ASSESSMENT:

1. Chest pain with fractional elevation of troponin.

2.  Hypertension.

3.  Hypercholesterolemia.

4.  Hypothyroidism.

5.  Gastroesophageal reflux disease.

6.  Status post partial colectomy.

7.  Status post cholecystectomy.



PLAN:  Cardiology consult and follow recommendations.  Trend cardiac

enzymes.  Continue aspirin and beta-blocker.  Echocardiogram.





__________________________________________

[facility] MD Joshua





DD:  05/07/2018 19:10:53

DT:  05/07/2018 19:13:57

Job # [id number]

## 2018-06-14 ENCOUNTER — HOSPITAL ENCOUNTER (INPATIENT)
Dept: HOSPITAL 14 - H.ER | Age: 83
LOS: 4 days | Discharge: NURSING, REHAB OR LONG-TERM CARE | DRG: 373 | End: 2018-06-18
Attending: INTERNAL MEDICINE | Admitting: INTERNAL MEDICINE
Payer: MEDICARE

## 2018-06-14 VITALS — BODY MASS INDEX: 25.1 KG/M2

## 2018-06-14 DIAGNOSIS — Z79.02: ICD-10-CM

## 2018-06-14 DIAGNOSIS — K44.9: ICD-10-CM

## 2018-06-14 DIAGNOSIS — E87.6: ICD-10-CM

## 2018-06-14 DIAGNOSIS — Z79.82: ICD-10-CM

## 2018-06-14 DIAGNOSIS — J45.20: ICD-10-CM

## 2018-06-14 DIAGNOSIS — Z95.1: ICD-10-CM

## 2018-06-14 DIAGNOSIS — I10: ICD-10-CM

## 2018-06-14 DIAGNOSIS — K76.0: ICD-10-CM

## 2018-06-14 DIAGNOSIS — A04.72: Primary | ICD-10-CM

## 2018-06-14 DIAGNOSIS — E86.0: ICD-10-CM

## 2018-06-14 DIAGNOSIS — Z90.49: ICD-10-CM

## 2018-06-14 DIAGNOSIS — E78.00: ICD-10-CM

## 2018-06-14 DIAGNOSIS — Z87.01: ICD-10-CM

## 2018-06-14 DIAGNOSIS — I25.10: ICD-10-CM

## 2018-06-14 LAB
ALBUMIN SERPL-MCNC: 3.3 G/DL (ref 3.5–5)
ALBUMIN/GLOB SERPL: 0.8 {RATIO} (ref 1–2.1)
ALT SERPL-CCNC: 61 U/L (ref 21–72)
ANISOCYTOSIS BLD QL SMEAR: (no result)
APTT BLD: 29.8 SECONDS (ref 25.6–37.1)
AST SERPL-CCNC: 58 U/L (ref 17–59)
BASE EXCESS BLDV CALC-SCNC: -6.6 MMOL/L (ref 0–2)
BASOPHILS # BLD AUTO: 0 K/UL (ref 0–0.2)
BASOPHILS NFR BLD: 0.2 % (ref 0–2)
BUN SERPL-MCNC: 9 MG/DL (ref 9–20)
CALCIUM SERPL-MCNC: 8.6 MG/DL (ref 8.4–10.2)
EOSINOPHIL # BLD AUTO: 0.1 K/UL (ref 0–0.7)
EOSINOPHIL NFR BLD: 0.8 % (ref 0–4)
EOSINOPHIL NFR BLD: 2 % (ref 0–7)
ERYTHROCYTE [DISTWIDTH] IN BLOOD BY AUTOMATED COUNT: 24.2 % (ref 11.5–14.5)
GFR NON-AFRICAN AMERICAN: > 60
HGB BLD-MCNC: 10.8 G/DL (ref 12–18)
HYPOCHROMIC: SLIGHT
INR PPP: 1.3 (ref 0.9–1.2)
LIPASE SERPL-CCNC: 66 U/L (ref 23–300)
LYMPHOCYTE: 12 % (ref 20–50)
LYMPHOCYTES # BLD AUTO: 0.7 K/UL (ref 1–4.3)
LYMPHOCYTES NFR BLD AUTO: 8.1 % (ref 20–40)
MCH RBC QN AUTO: 26.9 PG (ref 27–31)
MCHC RBC AUTO-ENTMCNC: 32.7 G/DL (ref 33–37)
MCV RBC AUTO: 82.2 FL (ref 80–94)
METAMYELOCYTES NFR BLD: 1 % (ref 0–0)
MICROCYTES BLD QL SMEAR: (no result)
MONOCYTE: 14 % (ref 0–10)
MONOCYTES # BLD: 1.3 K/UL (ref 0–0.8)
MONOCYTES NFR BLD: 15.4 % (ref 0–10)
MYELOCYTES NFR BLD: 1 % (ref 0–0)
NEUTROPHILS # BLD: 6.2 K/UL (ref 1.8–7)
NEUTROPHILS NFR BLD AUTO: 68 % (ref 42–75)
NEUTROPHILS NFR BLD AUTO: 75.5 % (ref 50–75)
NEUTS BAND NFR BLD: 2 % (ref 0–2)
NRBC BLD AUTO-RTO: 0 % (ref 0–0)
OVALOCYTES BLD QL SMEAR: SLIGHT
PCO2 BLDV: 27 MMHG (ref 40–60)
PH BLDV: 7.4 [PH] (ref 7.32–7.43)
PLATELET # BLD EST: NORMAL 10*3/UL
PLATELET # BLD: 154 K/UL (ref 130–400)
PMV BLD AUTO: 9.2 FL (ref 7.2–11.7)
POIKILOCYTOSIS BLD QL SMEAR: SLIGHT
PROTHROMBIN TIME: 14.1 SECONDS (ref 9.8–13.1)
RBC # BLD AUTO: 4 MIL/UL (ref 4.4–5.9)
TOTAL CELLS COUNTED BLD: 100
TOXIC GRANULES BLD QL SMEAR: PRESENT
VENOUS BLOOD FIO2: 21 %
VENOUS BLOOD GAS PO2: 41 MM/HG (ref 30–55)
WBC # BLD AUTO: 8.2 K/UL (ref 4.8–10.8)

## 2018-06-14 NOTE — ED PDOC
HPI: Abdomen


Time Seen by Provider: 06/14/18 17:33


Chief Complaint (Nursing): Abdominal Pain


Chief Complaint (Provider): Diarrhea


History Per: Patient


History/Exam Limitations: no limitations


Onset/Duration Of Symptoms: Days (x2)


Current Symptoms Are (Timing): Still Present


Associated Symptoms: Diarrhea, Loss Of Appetite


Additional Complaint(s): 


83 year old male sent in from nursing home for complaints of non-bloody, watery 

diarrhea, onset two days ago. Patient states he has had over ten episodes of 

diarrhea of which are sometimes black. Patient was diagnosed with C.Diff 

colitis and started antibiotics. Patient now complaints of cramping diffuse 

abdominal pain associated with decreased appetite. Denies vomit, fever, chills, 

and urinary symptoms.





PMD: Dr.Reinerio FILOMENA Cooney 








Past Medical History


Reviewed: Historical Data, Nursing Documentation, Vital Signs


Vital Signs: 


 Last Vital Signs











Temp  98.3 F   06/14/18 19:44


 


Pulse  84   06/14/18 19:44


 


Resp  19   06/14/18 19:44


 


BP  145/77   06/14/18 19:44


 


Pulse Ox  98   06/14/18 19:44














- Medical History


PMH: Asthma, Bronchitis, CAD, HTN, Hypercholesterolemia, Pneumonia (2011)


   Denies: Chronic Kidney Disease





- Surgical History


Surgical History: Appendectomy, Cholecystectomy, Endoscopy, Hernia Repair





- Family History


Family History: States: Unknown Family Hx





- Social History


Current smoker - smoking cessation education provided: No


Alcohol: None


Drugs: Denies





- Immunization History


Hx Tetanus Toxoid Vaccination: Yes


Hx Influenza Vaccination: Yes


Hx Pneumococcal Vaccination: Yes





- Home Medications


Home Medications: 


 Ambulatory Orders











 Medication  Instructions  Recorded


 


Rosuvastatin Calcium [Crestor] 10 mg PO HS 12/11/15


 


Aspirin [Ecotrin] 81 mg PO DAILY 05/07/18


 


Loratadine [Claritin] 10 mg PO DAILY 05/07/18


 


traMADol [Ultram] 50 mg PO Q6 PRN 05/07/18


 


Acetaminophen [Tylenol 325mg tab] 650 mg PO Q4 PRN 06/14/18


 


Acetaminophen [Tylenol 325mg tab] 650 mg PO Q4 PRN 06/14/18


 


Albuterol/Ipratropium [Duoneb 3 3 ml PO Q4 PRN 06/14/18





mg/0.5 mg (3 ml) UD]  


 


Ascorbic Acid [Vitamin C 250 mg 250 mg PO DAILY 06/14/18





Tab]  


 


Beclomethasone Dipropionate [Qvar 1 puff IH Q12 06/14/18





40 mcg]  


 


Cephalexin [Keflex] 500 mg PO Q12 06/14/18


 


Cholecalciferol [Vitamin D 1000 IU] 1,000 unit PO DAILY 06/14/18


 


Clopidogrel [Plavix] 75 mg PO DAILY 06/14/18


 


Famotidine [Pepcid] 20 mg PO HS 06/14/18


 


Ferrous Sulfate [Feosol] 325 mg PO DAILY 06/14/18


 


Ibuprofen [Motrin Tab] 400 mg PO Q6 PRN 06/14/18


 


Mag Hydrox/Aluminum Hyd/Simeth 30 ml PO Q4 PRN 06/14/18





[Maalox Advanced Suspension]  


 


Magnesium Hydroxide [Milk Of 30 ml PO DAILY PRN 06/14/18





Magnesia]  


 


Meclizine [Antivert] 12.5 mg PO Q8 PRN 06/14/18


 


Metoprolol Tartrate [Lopressor] 50 mg PO Q12 06/14/18


 


guaiFENesin [Mucinex LA] 600 mg PO Q12 PRN 06/14/18


 


metroNIDAZOLE [Flagyl] 500 mg PO Q8 06/14/18














- Allergies


Allergies/Adverse Reactions: 


 Allergies











Allergy/AdvReac Type Severity Reaction Status Date / Time


 


diphenhydramine HCl AdvReac  ANAPHYLAXIS Verified 06/14/18 17:21





[From Benadryl]     














Review of Systems


ROS Statement: Except As Marked, All Systems Reviewed And Found Negative (and 

as per HPI)


Constitutional: Negative for: Chills


Gastrointestinal: Positive for: Nausea, Abdominal Pain (cramping diffuse), 

Diarrhea (non-bloody, water, sometimes black color), Other (decreased PO intake)

.  Negative for: Vomiting





Physical Exam





- Reviewed


Nursing Documentation Reviewed: Yes


Vital Signs Reviewed: Yes





- Physical Exam


Appears: Positive for: Uncomfortable, In Acute Distress (chronically ill; in 

mild painful distress)


Head Exam: Positive for: ATRAUMATIC, NORMOCEPHALIC


Skin: Positive for: Warm, Dry


Eye Exam: Positive for: EOMI, PERRL


ENT: Negative for: Pharyngeal Erythema, Tonsillar Exudate


Neck: Positive for: Painless ROM, Supple


Cardiovascular/Chest: Positive for: Regular Rate, Rhythm, Chest Non Tender.  

Negative for: Murmur


Respiratory: Positive for: Normal Breath Sounds.  Negative for: Wheezing, 

Respiratory Distress


Gastrointestinal/Abdominal: Positive for: Normal Exam, Soft, Tenderness (

diffuse tenderness to palpation of abdomen).  Negative for: Mass, Guarding, 

Rebound


Back: Positive for: Normal Inspection.  Negative for: Decreased ROM


Extremity: Positive for: Normal ROM.  Negative for: Deformity


Lymphatic: Negative for: Adenopathy


Neurologic/Psych: Positive for: Alert.  Negative for: Motor/Sensory Deficits





- Laboratory Results


Result Diagrams: 


 06/14/18 18:10





 06/14/18 18:10





- ECG


ECG: Positive for: Interpreted By Me


ECG Rhythm: Positive for: Sinus Rhythm, Left Bundle Branch Block


O2 Sat by Pulse Oximetry: 97 (RA)


Pulse Ox Interpretation: Normal





Medical Decision Making


Medical Decision Making: 


Time: 1810


Impression: C.Diff colitis 


Plan:


-- Type and Screen 


-- Venous Blood Gas


-- CT Abd & Pelvis IV Contrast


-- EKG 


-- CMP 


-- Lipase


-- Magnesium 


-- Phosphorus 


-- ED Urine Dipstick 


-- CBC with differentials 


-- PTT 


-- Prothrombin Time


-- CXR Portable 


-- Sodium Chloride IV 1000 mls/hr 


-- Blood Culture


-- Urine Culture


-- IV Insertion 


-- Urinalysis 





 Labs demonstrated mild anemia and hypokalemia. Supplementation ordered.


 


CXR RESULTS


FINDINGS:





LUNGS:


No active pulmonary disease.





PLEURA:


No significant pleural effusion identified, no pneumothorax apparent.





CARDIOVASCULAR:


Cardiomegaly.  No evidence of acute, significant cardiovascular disease. 





OSSEOUS STRUCTURES:


No significant abnormalities.





VISUALIZED UPPER ABDOMEN:


Normal.





OTHER FINDINGS:


Probable hiatal hernia.





IMPRESSION:


No active disease.


 CANDY Diaz PMD. Pt admitted for cdiff colitis and abdominal pain





 EXAM:


CT Abdomen and Pelvis With Intravenous Contrast


CLINICAL HISTORY:


83 years old, male; Pain and signs and symptoms and condition or disease; Cancer

; Other:


Colorectal; Other: Weakness; Abdominal pain; Generalized; Prior surgery; 

Surgery date: 6+ months;


Surgery type: Appendectomy. Cholecystectomy. Ventral hernia repair; Additional 

info: Abd pain w h/o


c diff


TECHNIQUE:


Axial computed tomography images of the abdomen and pelvis with intravenous 

contrast. All CT


scans at this facility use one or more dose reduction techniques, viz.: 

automated exposure control;


ma/kV adjustment per patient size (including targeted exams where dose is 

matched to indication; i.e.


head); or iterative reconstruction technique.


Coronal and sagittal reformatted images were created and reviewed.


CONTRAST:


90 mL of omnipaque 300 administered intravenously.


COMPARISON:


No relevant prior studies available.


FINDINGS:


Lung bases: Bibasilar atelectasis. Possible early superimposed right lower lobe 

consolidation.


ABDOMEN:


Liver: The liver is normal in appearance.


Gallbladder and bile ducts: There has been a cholecystectomy. No ductal 

dilation.


Pancreas: Small focus of interdigitating fat within the pancreatic body. 

Otherwise normal


appearance of the pancreatic parenchyma. No ductal dilation.


Spleen: The spleen is normal.


Adrenals: The adrenal glands are normal.


Kidneys and ureters: Mild ectasia of the proximal ureters. No significant 

pelviectasis or caliectasis.


No renal collecting system calcification. Normal caliber distal ureters. 

Intermediate attenuation left


renal lesion with a probable internal septation. This measures 1.4 cm x 1.2 cm (

axial image 90).


Indeterminate partially exophytic right renal lesion measuring 1.5 cm x 1.3 cm (

axial image 73).


Stomach and bowel: Diffuse wall thickening throughout the colon which is 

diffusely fluid-filled


extending to the rectum. Postoperative changes and bowel anastomosis within the 

left upper


quadrant. Large hiatal hernia with the majority of the stomach being 

intrathoracic. No bowel dilation.


PELVIS:


Appendix: There has been an appendectomy.


Bladder: The bladder is normal.


Reproductive: The prostate gland and seminal vesicles are normal.


ABDOMEN and PELVIS:


Intraperitoneal space: Normal. No free air. No significant fluid collection.


Bones/joints: Diffuse thoracolumbar spondylosis with associated S-shaped 

curvature. Partially


imaged median sternotomy wires. No acute fracture. No dislocation.


Soft tissues: Small fat-containing left inguinal hernia. Postoperative findings 

consistent with prior


ventral herniorrhaphy.


Vasculature: The aorta demonstrates moderate atherosclerotic calcification. 

Calcific atherosclerosis


of the superior mesenteric artery ostium with associated ostial narrowing. 

Prosthetic aortic.


Moderate calcific atherosclerosis of the imaged coronary arteries. No abdominal 

aortic aneurysm.


Lymph nodes: Normal. No enlarged lymph nodes.


IMPRESSION:


1. Pancolonic wall thickening with internal fluid extending to the rectum. 

Correlate for infectious or


inflammatory colitis.


2. Bibasilar atelectasis possible early superimposed right lower lobe 

consolidation or aspiration.


3. Indeterminant renal lesions measuring up to 1.4 cm on the left. Probable 

internal septation. ACR


White Paper guidelines (Ana Cristina, et al. JACR 2010; 7(10):754-73) suggest 

abdominal CT or MRI


follow-up in 6 months.


4. Moderate calcific atherosclerosis of the abdominal aorta with narrowing of 

the superior mesenteric


artery ostium.


5. Large hiatal hernia with near-complete intrathoracic stomach.


6. Calcific atherosclerosis of the coronary arteries.


7. Other chronic findings as detailed above.


Thank you for allowing us to participate in the care of your patient.


Dictated and Authenticated by: Julio Man DO


06/14/2018 11:34 PM Eastern Time ([place] & [place])


 








___________________________________________________________


Scribe Attestation:


Documented by Toan Carranza, acting as a scribe for Dr. Digna Ren. 





Provider Scribe Attestation:


All medical record entries made by the Scribe were at my direction and 

personally dictated by me. I have reviewed the chart and agree that the record 

accurately reflects my personal performance of the history, physical exam, 

medical decision making, and the department course for this patient. I have 

also personally directed, reviewed and agree with the discharge instructions 

and disposition. 





Disposition





- Clinical Impression


Clinical Impression: 


 C. difficile colitis, Abdominal pain





Counseled Patient/Family Regarding: Studies Performed, Diagnosis





- Disposition


Disposition Time: 21:00


Condition: FAIR





- Pt Status Changed To:


Hospital Disposition Of: Inpatient





- Admit Certification


Admit to Inpatient:: After my assessment, the patient will require 

hospitalization for at least two midnights.  This is because of the severity of 

symptoms shown, intensity of services needed, and/or the medical risk in this 

patient being treated as an outpatient.





- POA


Present On Arrival: None

## 2018-06-14 NOTE — RAD
HISTORY:

Abdominal pain.



COMPARISON:

No prior. 



FINDINGS:



LUNGS:

No active pulmonary disease.



PLEURA:

No significant pleural effusion identified, no pneumothorax apparent.



CARDIOVASCULAR:

Cardiomegaly.  No evidence of acute, significant cardiovascular 

disease. 



OSSEOUS STRUCTURES:

No significant abnormalities.



VISUALIZED UPPER ABDOMEN:

Normal.



OTHER FINDINGS:

Probable hiatal hernia.



IMPRESSION:

No active disease.

## 2018-06-15 LAB
BILIRUB UR-MCNC: NEGATIVE MG/DL
BUN SERPL-MCNC: 6 MG/DL (ref 9–20)
CALCIUM SERPL-MCNC: 7.9 MG/DL (ref 8.4–10.2)
COLOR UR: YELLOW
ERYTHROCYTE [DISTWIDTH] IN BLOOD BY AUTOMATED COUNT: 24.4 % (ref 11.5–14.5)
GFR NON-AFRICAN AMERICAN: > 60
GLUCOSE UR STRIP-MCNC: (no result) MG/DL
HGB BLD-MCNC: 9.8 G/DL (ref 12–18)
LEUKOCYTE ESTERASE UR-ACNC: (no result) LEU/UL
MCH RBC QN AUTO: 27.1 PG (ref 27–31)
MCHC RBC AUTO-ENTMCNC: 33.4 G/DL (ref 33–37)
MCV RBC AUTO: 81.4 FL (ref 80–94)
PH UR STRIP: 6 [PH] (ref 5–8)
PLATELET # BLD: 157 K/UL (ref 130–400)
PROT UR STRIP-MCNC: NEGATIVE MG/DL
RBC # BLD AUTO: 3.59 MIL/UL (ref 4.4–5.9)
RBC # UR STRIP: NEGATIVE /UL
SP GR UR STRIP: 1.01 (ref 1–1.03)
URINE CLARITY: CLEAR
UROBILINOGEN UR-MCNC: (no result) MG/DL (ref 0.2–1)
WBC # BLD AUTO: 6.8 K/UL (ref 4.8–10.8)

## 2018-06-15 RX ADMIN — VANCOMYCIN HYDROCHLORIDE SCH MG: 500 INJECTION, POWDER, LYOPHILIZED, FOR SOLUTION INTRAVENOUS at 22:14

## 2018-06-15 RX ADMIN — VITAMIN D, TAB 1000IU (100/BT) SCH INTLU: 25 TAB at 11:04

## 2018-06-15 RX ADMIN — CHOLESTYRAMINE SCH GM: 4 POWDER, FOR SUSPENSION ORAL at 14:27

## 2018-06-15 RX ADMIN — VANCOMYCIN HYDROCHLORIDE SCH MG: 500 INJECTION, POWDER, LYOPHILIZED, FOR SOLUTION INTRAVENOUS at 18:44

## 2018-06-15 RX ADMIN — Medication SCH MG: at 11:01

## 2018-06-15 RX ADMIN — VANCOMYCIN HYDROCHLORIDE SCH MG: 500 INJECTION, POWDER, LYOPHILIZED, FOR SOLUTION INTRAVENOUS at 11:03

## 2018-06-15 RX ADMIN — Medication SCH MG: at 18:45

## 2018-06-15 RX ADMIN — CHOLESTYRAMINE SCH GM: 4 POWDER, FOR SUSPENSION ORAL at 23:54

## 2018-06-15 RX ADMIN — VANCOMYCIN HYDROCHLORIDE SCH MG: 500 INJECTION, POWDER, LYOPHILIZED, FOR SOLUTION INTRAVENOUS at 15:00

## 2018-06-15 RX ADMIN — MOMETASONE FUROATE SCH MCG: 220 INHALANT RESPIRATORY (INHALATION) at 18:45

## 2018-06-15 NOTE — HP
HISTORY OF PRESENT ILLNESS:  This is an 83 years old  male who is

known to me, was in subacute rehabilitation post-coronary artery bypass

graft.  The patient started to develop severe diarrhea associated with

abdominal pain and fever.  The patient had stool for C. difficile positive,

and he was started on Flagyl.  The patient continued to have diarrhea while

he was on Flagyl, and the patient was transferred to emergency room for

evaluation and admitted for further management.  The patient stated that he

has no appetite, and abdominal pain is colicky in nature, 8/10.  Positive

symptoms of nausea, but there is no vomiting.  Other review of systems is

negative.



ALLERGIES:  REPORTED ALLERGY TO DIPHENHYDRAMINE.



PAST MEDICAL HISTORY:  Coronary artery bypass graft, hypertension, status

post cholecystectomy, status post partial colectomy, status post multiple

admissions for small bowel obstruction, hypercholesterolemia.



SOCIAL HISTORY:  No history of smoking, EtOH, or substance abuse.



FAMILY HISTORY:  Noncontributory.



PHYSICAL EXAMINATION:

GENERAL:  The patient was in bed, in mild distress due to the abdominal

pain.

VITAL SIGNS:  With blood pressure 145/77, temperature 98.3, respiratory

rate 20, and pulse 84.

HEENT:  Pupils equal and reactive to light.  Normal-appearing mucosa of the

conjunctivae, oropharynx and nasal membrane mucosa.

NECK:  Supple.  No JVD.  No carotid bruit.  No lymph node.  No thyromegaly.

CHEST AND LUNGS:  Bilateral symmetrical expansion.  Good air exchange.  No

rales.  No rhonchi.

CARDIOVASCULAR SYSTEM:  PMI not localized.  S1, S2.  No additional sounds.

ABDOMEN:  Decreased bowel sounds.  There is diffuse abdominal tenderness,

mostly lower both sides.

EXTREMITIES:  No cyanosis, no clubbing, no edema.

CNS:  Alert, awake, oriented x2 and no neurological deficit could be

appreciated.



ASSESSMENT:  Moderate-to-severe Clostridium difficile colitis, dehydration,

status post coronary artery bypass graft, hypokalemia.



PLAN:  We will start the patient on vancomycin p.o. and IV fluid, and we

will resume the patient's medications and withhold any broad-spectrum

antibiotic at this point.  Discussed with the patient's family at the

bedside.





__________________________________________

Brina Diaz MD





DD:  06/14/2018 22:46:43

DT:  06/14/2018 22:48:28

[facility] # [id number]

## 2018-06-15 NOTE — PQF GENQUE
Dr. Diaz,



3.queries as follows: 



1. Your help is needed in capturing diagnoses for the corresponding medications 
ordered. Please clarify in the documentation diagnoses for the following 
medication(s). 

Medications:Albuterol INH  Q4 PRN, Mometasone INH QPM



2. If in agreement: Severity  of Asthma if known: i.e.Stable  versus  
associated with exacerbation or status asthmaticus. 



3. Type of Asthma if known: i.e 

-- Mild intermittent 

  -- Mild persistent 

  -- Moderate persistent 

  -- Severe persistent 







ER MD:   PMH: Asthma, Bronchitis



Albuterol INH Q4 PRN, Mometasone INH QPM









***** This form is a permanent part of the medical record*****





          

Clarification of your documentation is requested to better reflect the severity 
of illness and intensity of treatment of your patient.  



Indicators present      



[]  Specify: []

         



[]  Specify: []         

 



[]  Specify: []         





[]  Specify: []         





Location in the medical record that reflects the above clinical findings:  []

 



Treatment Provided:  []

  

PHYSICIAN'S RESPONSE





Based on your medical judgment of the clinical indicators outlined above please 
clarify the following:



[]  Practitioner response 



[]  If unable to determine, please check the box, sign and date.  





Present On Admission (POA) Indicator:





[] Present at the time of admission 



[] Not present at the time of admission



[] Clinically Undetermined









In responding to this query, please exercise your independent professional 
judgment.  The fact that a question is asked does not imply that any particular 
answer is desired or expected.  Thank you for your clarification on this 
documentation.



If you have any questions please call.

* Thank you,

   Antoinette Hope RN

   ext. #[phone] 
MTDD

## 2018-06-15 NOTE — CARD
--------------- APPROVED REPORT --------------





EKG Measurement

Heart [id number]

AR [id number]

[id number]

[id number]

[id number]



<Conclusion>

Normal sinus rhythm

Left bundle branch block

Abnormal ECG

## 2018-06-15 NOTE — CT
PROCEDURE:  CT Abdomen and Pelvis with contrast



HISTORY:

abd pain w h/o c diff



COMPARISON:

None.



TECHNIQUE:

Contrast dose: Omnipaque 300, 90 cc



Radiation dose:



Total exam DLP = 915.79 mGy-cm.



This CT exam was performed using one or more of the following dose 

reduction techniques: Automated exposure control, adjustment of the 

mA and/or kV according to patient size, and/or use of iterative 

reconstruction technique.



FINDINGS:



LOWER THORAX:

Prior median sternotomy identified as well as aortic valve 

replacement. Mild cardiomegaly is noted. Dependent atelectasis seen 

at the right greater than left lower lobes no pleural or pericardial 

effusion.  A moderately large hiatal hernia is identified. 



LIVER:

Diminished attenuation throughout the liver is compatible with 

hepatic steatosis. No discrete mass or definite intrahepatic biliary 

dilatation identified. 



GALLBLADDER AND BILE DUCTS:

Prior cholecystectomy identified. Extrahepatic bile ducts appear 

normal caliber with a tiny calcification within the pancreas abutting 

the distal common bile duct favored over choledocholithiasis. 



PANCREAS:

Questionable solitary punctate pancreatic head calcification. No 

gross lesion or ductal dilatation.



SPLEEN:

Unremarkable. 



ADRENALS:

Unremarkable. No mass. 



KIDNEYS AND URETERS:

1.6 cm cyst in the posterior margins of the upper pole right kidney 

with a 1.4 cm apparent complex is identified at the mid to lower pole 

left kidney posteriorly with a solitary septation. There is 

borderline bilateral hydronephrosis and hydroureter likely on a 

hydrostatic basis given prominent distention of the urinary bladder. 

No mural thickening or radiodense urolithiasis is seen at the urinary 

bladder. No radiodense urolithiasis in the bilateral kidneys or 

ureters either. 



VASCULATURE:

Unremarkable. No aortic aneurysm. 



BOWEL:

The stomach is collapsed.  There is no bowel obstruction appreciated. 

Liquified fecal material seen throughout the colon diffusely with 

mild mural thickening affecting the mid to distal ascending through 

transverse large-bowel segments.  Borderline thickening is seen 

throughout the remainder this patient with prior history of colitis. 

No free intraperitoneal gas or local abscess. Evaluation the rectum 

is limited due to partial collapse. Thickening of the rectum is not 

excluded completely. Anastomotic suture line is seen in the sigmoid 

colon suggesting prior segmental resection 



APPENDIX:

Not identified.  Surgical staples at the cecal base suggest prior 

appendectomy.  Clinically correlate.  No CT evidence of appendicitis. 



PERITONEUM:

No free fluid. No free air. Prior right ventral hernia repair 

identified. 



LYMPH NODES:

Unremarkable. No enlarged lymph nodes. 



BLADDER:

Unremarkable. 



REPRODUCTIVE:

Unremarkable. 



BONES:

No acute fracture. 



OTHER FINDINGS:

None.



IMPRESSION:

1. Variable mural thickening of the colon is appreciated diffusely, 

with the proximal half of the colon more affected than the distal 

half potentially reflecting colitis under therapy in this patient 

with a known history of C diff colitis. Further clinical correlation 

is advised.  Other etiologies are possible. Liquid fecal material and 

gas are identified within the colon. No bowel obstruction evident. 



2. No abscess, ascites or free intrarenal gas. 



3. Complex left renal cyst for which follow-up CT or MRI without 

contrast is advised in 6 months. 



4. Hepatic steatosis. 



5. Prior segmental resection mid sigmoid colon. Other lesser findings 

as discussed above. 



Concordant preliminary report from [facility], 06/14/2018.

## 2018-06-16 LAB
ALBUMIN SERPL-MCNC: 2.8 G/DL (ref 3.5–5)
ALBUMIN/GLOB SERPL: 0.8 {RATIO} (ref 1–2.1)
ALT SERPL-CCNC: 40 U/L (ref 21–72)
AST SERPL-CCNC: 32 U/L (ref 17–59)
BUN SERPL-MCNC: 3 MG/DL (ref 9–20)
CALCIUM SERPL-MCNC: 7.9 MG/DL (ref 8.4–10.2)
GFR NON-AFRICAN AMERICAN: > 60

## 2018-06-16 RX ADMIN — Medication SCH MG: at 16:52

## 2018-06-16 RX ADMIN — VITAMIN D, TAB 1000IU (100/BT) SCH INTLU: 25 TAB at 09:26

## 2018-06-16 RX ADMIN — VANCOMYCIN HYDROCHLORIDE SCH MG: 500 INJECTION, POWDER, LYOPHILIZED, FOR SOLUTION INTRAVENOUS at 21:49

## 2018-06-16 RX ADMIN — CHOLESTYRAMINE SCH GM: 4 POWDER, FOR SUSPENSION ORAL at 11:43

## 2018-06-16 RX ADMIN — VANCOMYCIN HYDROCHLORIDE SCH MG: 500 INJECTION, POWDER, LYOPHILIZED, FOR SOLUTION INTRAVENOUS at 09:26

## 2018-06-16 RX ADMIN — VANCOMYCIN HYDROCHLORIDE SCH MG: 500 INJECTION, POWDER, LYOPHILIZED, FOR SOLUTION INTRAVENOUS at 16:52

## 2018-06-16 RX ADMIN — Medication SCH MG: at 09:25

## 2018-06-16 RX ADMIN — CHOLESTYRAMINE SCH GM: 4 POWDER, FOR SUSPENSION ORAL at 23:59

## 2018-06-16 RX ADMIN — VANCOMYCIN HYDROCHLORIDE SCH MG: 500 INJECTION, POWDER, LYOPHILIZED, FOR SOLUTION INTRAVENOUS at 12:47

## 2018-06-16 RX ADMIN — MOMETASONE FUROATE SCH MCG: 220 INHALANT RESPIRATORY (INHALATION) at 17:03

## 2018-06-16 NOTE — PN
DATE:  06/16/2018



SUBJECTIVE:  The patient is seen today 06/16/2018.  He had two more loose

bowel movements.  He is still on IV fluid.



PHYSICAL EXAMINATION:

VITAL SIGNS:  Blood pressure 153/79, temperature 97.8, respiratory rate 18

and pulse 74.

HEENT:  Pupils equal, reactive to light.  Normal-appearing mucosa of the

conjunctivae, oropharynx and nasal membrane mucosa.

NECK:  Supple.  No JVD.  No carotid bruit.  No lymph node.  No thyromegaly.

CHEST AND LUNGS:  Bilateral symmetrical expansion, good air exchange.  No

rales, no rhonchi.

CARDIOVASCULAR SYSTEM:  PMI not localized.  S1, S2.  No additional sounds.

ABDOMEN:  Normoactive bowel sounds.  No tenderness.  No organomegaly.  No

masses.

EXTREMITIES:  No cyanosis, no clubbing, no edema.

CNS:  Alert, awake, oriented x2.  No neurological deficit could be

appreciated.



ASSESSMENT:

1.  Moderate to severe Clostridium difficile.

2.  Coronary artery disease status post coronary artery bypass graft.

3.  Hypercholesterolemia.



PLAN:  Continue vancomycin 125 mg p.o. q.i.d., monitor electrolytes and

supplement potassium as needed.  Will give cholestyramine twice a day as

needed for diarrhea.







__________________________________________

Brina Diaz MD





DD:  06/16/2018 8:09:26

DT:  06/16/2018 8:10:55

Job # [id number]

## 2018-06-16 NOTE — PN
DATE:  06/15/2018



SUBJECTIVE:  The patient is seen today, 06/15/2018.  He still has diarrhea,

and he is not in any cardiopulmonary distress.



PHYSICAL EXAMINATION:

VITAL SIGNS:  Blood pressure 161/77, temperature 97.3, respiratory rate 20,

and pulse 80.

HEENT:  Pupils are equal and reactive to light.  Normal-appearing mucosa of

the conjunctivae, oropharynx, and nasal membrane mucosa.

NECK:  Supple.  No JVD.  No carotid bruits.  No lymph nodes.  No

thyromegaly.

CHEST AND LUNGS:  Bilateral symmetrical expansion.  Good air exchange.  No

rales.  No rhonchi.

CARDIOVASCULAR SYSTEM:  PMI not localized.  S1 and S2.  No additional

sounds.

ABDOMEN:  Normoactive bowel sounds.  No tenderness.  No organomegaly.  No

masses.

EXTREMITIES:  No cyanosis.  No clubbing.  No edema.

CENTRAL NERVOUS SYSTEM:  Alert, awake, and oriented x2.  No neurological

deficit could be appreciated.



ASSESSMENT:

1.  Clostridium difficile colitis with the stool was done _____ that was

found to be positive.

2.  CAT scan finding of mural thickening of the colon, which was

appreciated diffusely with approximately half of the colon more effective

than distal half.  No abscess, ascites, or free internal gas.

3.  Hypercholesterolemia.

4.  Fatty liver.

5.  Status post coronary artery bypass graft.



PLAN:  Continue vancomycin 125 mg p.o. 4 times daily, IV fluid.  We will

give Questran twice a day p.r.n. for diarrhea.  Monitor electrolytes.





__________________________________________

Brina Diaz MD





DD:  06/15/2018 22:09:20

DT:  06/15/2018 23:51:01

Job # [id number]

## 2018-06-17 RX ADMIN — VANCOMYCIN HYDROCHLORIDE SCH MG: 500 INJECTION, POWDER, LYOPHILIZED, FOR SOLUTION INTRAVENOUS at 21:08

## 2018-06-17 RX ADMIN — VANCOMYCIN HYDROCHLORIDE SCH MG: 500 INJECTION, POWDER, LYOPHILIZED, FOR SOLUTION INTRAVENOUS at 12:30

## 2018-06-17 RX ADMIN — MOMETASONE FUROATE SCH MCG: 220 INHALANT RESPIRATORY (INHALATION) at 17:01

## 2018-06-17 RX ADMIN — ALUMINUM HYDROXIDE, MAGNESIUM HYDROXIDE, AND SIMETHICONE PRN ML: 200; 200; 20 SUSPENSION ORAL at 14:58

## 2018-06-17 RX ADMIN — VITAMIN D, TAB 1000IU (100/BT) SCH INTLU: 25 TAB at 09:11

## 2018-06-17 RX ADMIN — VANCOMYCIN HYDROCHLORIDE SCH MG: 500 INJECTION, POWDER, LYOPHILIZED, FOR SOLUTION INTRAVENOUS at 16:58

## 2018-06-17 RX ADMIN — Medication SCH MG: at 16:57

## 2018-06-17 RX ADMIN — CHOLESTYRAMINE SCH: 4 POWDER, FOR SUSPENSION ORAL at 12:38

## 2018-06-17 RX ADMIN — VANCOMYCIN HYDROCHLORIDE SCH MG: 500 INJECTION, POWDER, LYOPHILIZED, FOR SOLUTION INTRAVENOUS at 09:19

## 2018-06-17 RX ADMIN — SODIUM CHLORIDE AND POTASSIUM CHLORIDE SCH MLS/HR: 9; 1.49 INJECTION, SOLUTION INTRAVENOUS at 11:00

## 2018-06-17 RX ADMIN — Medication SCH MG: at 09:11

## 2018-06-17 RX ADMIN — CHOLESTYRAMINE SCH GM: 4 POWDER, FOR SUSPENSION ORAL at 12:29

## 2018-06-18 ENCOUNTER — HOSPITAL ENCOUNTER (INPATIENT)
Dept: HOSPITAL 14 - H.TCU | Age: 83
LOS: 8 days | Discharge: HOME HEALTH | DRG: 373 | End: 2018-06-26
Attending: INTERNAL MEDICINE | Admitting: INTERNAL MEDICINE
Payer: COMMERCIAL

## 2018-06-18 VITALS
DIASTOLIC BLOOD PRESSURE: 84 MMHG | SYSTOLIC BLOOD PRESSURE: 157 MMHG | HEART RATE: 77 BPM | TEMPERATURE: 97.6 F | OXYGEN SATURATION: 100 %

## 2018-06-18 VITALS — RESPIRATION RATE: 20 BRPM

## 2018-06-18 VITALS — BODY MASS INDEX: 23.7 KG/M2

## 2018-06-18 DIAGNOSIS — A04.72: Primary | ICD-10-CM

## 2018-06-18 DIAGNOSIS — E78.5: ICD-10-CM

## 2018-06-18 DIAGNOSIS — E78.00: ICD-10-CM

## 2018-06-18 DIAGNOSIS — I10: ICD-10-CM

## 2018-06-18 DIAGNOSIS — Z78.9: ICD-10-CM

## 2018-06-18 DIAGNOSIS — Z95.1: ICD-10-CM

## 2018-06-18 DIAGNOSIS — D64.89: ICD-10-CM

## 2018-06-18 DIAGNOSIS — Z90.49: ICD-10-CM

## 2018-06-18 DIAGNOSIS — I25.10: ICD-10-CM

## 2018-06-18 PROCEDURE — F08Z4FZ HOME MANAGEMENT TREATMENT USING ASSISTIVE, ADAPTIVE, SUPPORTIVE OR PROTECTIVE EQUIPMENT: ICD-10-PCS | Performed by: INTERNAL MEDICINE

## 2018-06-18 PROCEDURE — F07Z9FZ GAIT TRAINING/FUNCTIONAL AMBULATION TREATMENT USING ASSISTIVE, ADAPTIVE, SUPPORTIVE OR PROTECTIVE EQUIPMENT: ICD-10-PCS | Performed by: INTERNAL MEDICINE

## 2018-06-18 RX ADMIN — VITAMIN D, TAB 1000IU (100/BT) SCH INTLU: 25 TAB at 08:27

## 2018-06-18 RX ADMIN — ALUMINUM HYDROXIDE, MAGNESIUM HYDROXIDE, AND SIMETHICONE PRN ML: 200; 200; 20 SUSPENSION ORAL at 10:17

## 2018-06-18 RX ADMIN — VANCOMYCIN HYDROCHLORIDE SCH MG: 500 INJECTION, POWDER, LYOPHILIZED, FOR SOLUTION INTRAVENOUS at 12:20

## 2018-06-18 RX ADMIN — SUCRALFATE SCH GM: 1 SUSPENSION ORAL at 12:35

## 2018-06-18 RX ADMIN — VANCOMYCIN HYDROCHLORIDE SCH MG: 500 INJECTION, POWDER, LYOPHILIZED, FOR SOLUTION INTRAVENOUS at 08:26

## 2018-06-18 RX ADMIN — VANCOMYCIN HYDROCHLORIDE SCH MG: 500 INJECTION, POWDER, LYOPHILIZED, FOR SOLUTION INTRAVENOUS at 16:43

## 2018-06-18 RX ADMIN — MOMETASONE FUROATE SCH MCG: 220 INHALANT RESPIRATORY (INHALATION) at 18:00

## 2018-06-18 RX ADMIN — Medication SCH MG: at 08:25

## 2018-06-18 RX ADMIN — ALUMINUM HYDROXIDE, MAGNESIUM HYDROXIDE, AND SIMETHICONE PRN ML: 200; 200; 20 SUSPENSION ORAL at 22:15

## 2018-06-18 RX ADMIN — Medication SCH MG: at 16:42

## 2018-06-18 RX ADMIN — SODIUM CHLORIDE AND POTASSIUM CHLORIDE SCH: 9; 1.49 INJECTION, SOLUTION INTRAVENOUS at 06:40

## 2018-06-18 RX ADMIN — VANCOMYCIN HYDROCHLORIDE SCH MG: 500 INJECTION, POWDER, LYOPHILIZED, FOR SOLUTION INTRAVENOUS at 23:27

## 2018-06-18 RX ADMIN — ALUMINUM HYDROXIDE, MAGNESIUM HYDROXIDE, AND SIMETHICONE PRN ML: 200; 200; 20 SUSPENSION ORAL at 18:01

## 2018-06-18 RX ADMIN — SUCRALFATE SCH GM: 1 SUSPENSION ORAL at 16:42

## 2018-06-18 NOTE — PN
DATE:  06/17/2018



DAILY PROGRESS NOTE



SUBJECTIVE:  The patient is seen today, 06/17/2018.  He is not in any

cardiopulmonary distress.



PHYSICAL EXAMINATION:

VITAL SIG  NS:  With blood pressure 154/83, temperature 97.5, respiratory

rate 20 and pulse 79.

HEENT:  Pupils equal, reactive to light.  Normal-appearing mucosa of the

conjunctivae, oropharynx and nasal membrane mucosa.

NECK:  Supple.  No JVD.  No carotid bruit.  No lymph node.  No thyromegaly.

CHEST AND LUNGS:  Bilateral symmetrical expansion.  Good air exchange.  No

rales.  No rhonchi.

CARDIOVASCULAR SYSTEM:  PMI not localized.  S1, S2.  No additional sounds.

ABDOMEN:  Normoactive bowel sounds.  No tenderness.  No organomegaly.  No

masses.

EXTREMITIES:  No cyanosis, no clubbing, no edema.

CNS:  Alert, awake, oriented x2.  No neurological deficit could be

appreciated.



ASSESSMENT:  Clostridium difficile colitis, history of coronary artery

disease, status post coronary artery bypass graft, hypertension,

hypercholesteremia, status post partial colectomy.



PLAN:  We will continue vancomycin 125 mg p.o. four times a day and

continue current medications.





__________________________________________

Brina Diaz MD





DD:  06/17/2018 21:01:32

DT:  06/17/2018 21:03:20

Job # [id number]

## 2018-06-19 VITALS — RESPIRATION RATE: 20 BRPM

## 2018-06-19 PROCEDURE — F07M6FZ THERAPEUTIC EXERCISE TREATMENT OF MUSCULOSKELETAL SYSTEM - WHOLE BODY USING ASSISTIVE, ADAPTIVE, SUPPORTIVE OR PROTECTIVE EQUIPMENT: ICD-10-PCS | Performed by: INTERNAL MEDICINE

## 2018-06-19 RX ADMIN — VANCOMYCIN HYDROCHLORIDE SCH MG: 500 INJECTION, POWDER, LYOPHILIZED, FOR SOLUTION INTRAVENOUS at 13:00

## 2018-06-19 RX ADMIN — VANCOMYCIN HYDROCHLORIDE SCH MG: 500 INJECTION, POWDER, LYOPHILIZED, FOR SOLUTION INTRAVENOUS at 17:23

## 2018-06-19 RX ADMIN — GUAIFENESIN PRN MG: 600 TABLET, EXTENDED RELEASE ORAL at 08:27

## 2018-06-19 RX ADMIN — SUCRALFATE SCH GM: 1 SUSPENSION ORAL at 14:00

## 2018-06-19 RX ADMIN — VANCOMYCIN HYDROCHLORIDE SCH MG: 500 INJECTION, POWDER, LYOPHILIZED, FOR SOLUTION INTRAVENOUS at 08:29

## 2018-06-19 RX ADMIN — VANCOMYCIN HYDROCHLORIDE SCH MG: 500 INJECTION, POWDER, LYOPHILIZED, FOR SOLUTION INTRAVENOUS at 21:35

## 2018-06-19 RX ADMIN — SUCRALFATE SCH GM: 1 SUSPENSION ORAL at 08:26

## 2018-06-19 RX ADMIN — Medication SCH MG: at 08:27

## 2018-06-19 RX ADMIN — MOMETASONE FUROATE SCH PUFF: 220 INHALANT RESPIRATORY (INHALATION) at 17:22

## 2018-06-19 RX ADMIN — VITAMIN D, TAB 1000IU (100/BT) SCH INTLU: 25 TAB at 08:29

## 2018-06-19 RX ADMIN — SUCRALFATE SCH GM: 1 SUSPENSION ORAL at 16:54

## 2018-06-19 RX ADMIN — Medication SCH MG: at 17:22

## 2018-06-19 NOTE — DS
REASON FOR ADMISSION:  This is an 83-year-old  male with history of

coronary artery disease, status post coronary artery bypass graft, was

admitted for C. diff colitis with sepsis.



COURSE OF HOSPITALIZATION:   The patient was admitted to medical floor, and

he was started on vancomycin 125 mg four times a day.  The patient's

symptoms gradually improved, and the patient was having unsteady gait and

he required more physical therapy.  The patient was discharged to

transitional care unit at [facility] for physical

therapy and to continue current medications.



FINAL DIAGNOSES:  Clostridium difficile colitis, hypertension,

hypercholesterolemia, coronary artery disease, status post coronary artery

bypass graft.





__________________________________________

[facility] MD Joe





DD:  06/18/2018 21:27:10

DT:  06/18/2018 21:27:55

Job # [id number]

## 2018-06-20 RX ADMIN — VANCOMYCIN HYDROCHLORIDE SCH MG: 500 INJECTION, POWDER, LYOPHILIZED, FOR SOLUTION INTRAVENOUS at 13:07

## 2018-06-20 RX ADMIN — VANCOMYCIN HYDROCHLORIDE SCH MG: 500 INJECTION, POWDER, LYOPHILIZED, FOR SOLUTION INTRAVENOUS at 21:39

## 2018-06-20 RX ADMIN — ALUMINUM HYDROXIDE, MAGNESIUM HYDROXIDE, AND SIMETHICONE PRN ML: 200; 200; 20 SUSPENSION ORAL at 08:41

## 2018-06-20 RX ADMIN — GUAIFENESIN PRN MG: 600 TABLET, EXTENDED RELEASE ORAL at 08:32

## 2018-06-20 RX ADMIN — SUCRALFATE SCH GM: 1 SUSPENSION ORAL at 17:15

## 2018-06-20 RX ADMIN — VANCOMYCIN HYDROCHLORIDE SCH MG: 500 INJECTION, POWDER, LYOPHILIZED, FOR SOLUTION INTRAVENOUS at 08:55

## 2018-06-20 RX ADMIN — VITAMIN D, TAB 1000IU (100/BT) SCH INTLU: 25 TAB at 08:35

## 2018-06-20 RX ADMIN — VANCOMYCIN HYDROCHLORIDE SCH MG: 500 INJECTION, POWDER, LYOPHILIZED, FOR SOLUTION INTRAVENOUS at 17:16

## 2018-06-20 RX ADMIN — MOMETASONE FUROATE SCH MCG: 220 INHALANT RESPIRATORY (INHALATION) at 18:01

## 2018-06-20 RX ADMIN — SUCRALFATE SCH GM: 1 SUSPENSION ORAL at 08:31

## 2018-06-20 RX ADMIN — ALUMINUM HYDROXIDE, MAGNESIUM HYDROXIDE, AND SIMETHICONE PRN ML: 200; 200; 20 SUSPENSION ORAL at 18:01

## 2018-06-20 RX ADMIN — SUCRALFATE SCH GM: 1 SUSPENSION ORAL at 12:15

## 2018-06-20 RX ADMIN — Medication SCH MG: at 08:32

## 2018-06-21 RX ADMIN — VANCOMYCIN HYDROCHLORIDE SCH MG: 500 INJECTION, POWDER, LYOPHILIZED, FOR SOLUTION INTRAVENOUS at 08:50

## 2018-06-21 RX ADMIN — MOMETASONE FUROATE SCH MCG: 220 INHALANT RESPIRATORY (INHALATION) at 17:20

## 2018-06-21 RX ADMIN — ALUMINUM HYDROXIDE, MAGNESIUM HYDROXIDE, AND SIMETHICONE PRN ML: 200; 200; 20 SUSPENSION ORAL at 13:31

## 2018-06-21 RX ADMIN — ALUMINUM HYDROXIDE, MAGNESIUM HYDROXIDE, AND SIMETHICONE PRN ML: 200; 200; 20 SUSPENSION ORAL at 08:53

## 2018-06-21 RX ADMIN — VANCOMYCIN HYDROCHLORIDE SCH MG: 500 INJECTION, POWDER, LYOPHILIZED, FOR SOLUTION INTRAVENOUS at 21:44

## 2018-06-21 RX ADMIN — SUCRALFATE SCH GM: 1 SUSPENSION ORAL at 16:20

## 2018-06-21 RX ADMIN — SUCRALFATE SCH GM: 1 SUSPENSION ORAL at 06:35

## 2018-06-21 RX ADMIN — VANCOMYCIN HYDROCHLORIDE SCH MG: 500 INJECTION, POWDER, LYOPHILIZED, FOR SOLUTION INTRAVENOUS at 17:19

## 2018-06-21 RX ADMIN — SUCRALFATE SCH GM: 1 SUSPENSION ORAL at 10:08

## 2018-06-21 RX ADMIN — VANCOMYCIN HYDROCHLORIDE SCH: 500 INJECTION, POWDER, LYOPHILIZED, FOR SOLUTION INTRAVENOUS at 13:32

## 2018-06-21 NOTE — HP
HISTORY OF PRESENT ILLNESS:  History and physical was done on 06/19/2018. 

The patient was seen in transitional care unit.  He is an 83 years old

 male with a history of multiple medical problems, was admitted to

transitional care unit for deconditioning and rehabilitation.  The patient

was admitted to acute care floor for moderate-to-severe C. difficile

colitis with dehydration.  The patient was given IV fluid and was started

on vancomycin 125 mg 4 times a day.  The patient's symptoms remarkably

improved and due to generalized weakness and need for reconditioning, the

patient was discharged from acute care to transitional care unit.  The

patient recently had a coronary artery bypass graft, and he was in [facility]

_____ before he was admitted last week to acute care in [facility].  Other review of systems is negative.



ALLERGIES:  FOR DIPHENHYDRAMINE.



MEDICATIONS:  Reviewed, as per MAR.



PAST MEDICAL HISTORY:  Hypertension, status post partial colectomy, status

post cholecystectomy, status post coronary artery bypass graft, C.

difficile colitis.



SOCIAL HISTORY:  No history of smoking, EtOH, or substance abuse.



FAMILY HISTORY:  Not contributory.



PHYSICAL EXAMINATION:

VITAL SIGNS:  Blood pressure was 150/72, temperature 97.9, respiratory rate

20, and pulse 79.

HEENT:  Pupils equal and reactive to light.  Normal-appearing mucosa of the

conjunctivae, oropharynx and nasal membrane mucosa.

NECK:  Supple.  No JVD.  No carotid bruit.  No lymph node.  No thyromegaly.

CHEST AND LUNGS:  Bilateral symmetrical expansion.  Good air exchange.  No

rales.  No rhonchi.

CARDIOVASCULAR SYSTEM:  PMI not localized.  S1, S2.  No additional sounds.

ABDOMEN:  Normoactive bowel sounds.  No tenderness, no organomegaly, no

masses.

EXTREMITIES:  No cyanosis, no clubbing, no edema.

CNS:  Alert, awake, oriented x2.  No neurological deficit could be

appreciated.



ASSESSMENT:  Clostridium difficile colitis, hypercholesterolemia, status

post coronary artery bypass graft.



PLAN:  Continue current medications, vancomycin and p.o. Questran as needed

for diarrhea.  Physical therapy and occupational therapy.





__________________________________________

Portia MD Joe





DD:  06/20/2018 18:30:25

DT:  06/21/2018 2:22:16

Job # [id number]

## 2018-06-21 NOTE — PN
DATE:  06/21/2018



SUBJECTIVE:  The patient is seen today, 06/21/2018.  He moves his bowel

only once today.  He feels overall better.



OBJECTIVE:

VITAL SIGNS:  Blood pressure 129/72, temperature 97, respiratory rate 20,

and pulse 85.

HEENT:  Pupils equal and reactive to light.  Normal-appearing mucosa of the

conjunctivae, oropharynx, and nasal membrane mucosa.

NECK:  Supple.  No JVD.  No carotid bruit.  No lymph node.  No thyromegaly.

CHEST AND LUNGS:  Bilateral symmetrical expansion.  Good air exchange.  No

rales, no rhonchi.

CARDIOVASCULAR SYSTEM:  PMI not localized.  S1, S2.  No additional sounds.

ABDOMEN:  Normoactive bowel sounds.  No tenderness.  No organomegaly.  No

masses.

EXTREMITIES:  No cyanosis, no clubbing, no edema.

CENTRAL NERVOUS SYSTEM:  Alert, awake, and oriented x2.  No neurological

deficit could be appreciated.



ASSESSMENT:

1.  Clostridium difficile colitis.

2.  Status post coronary artery bypass graft surgery.

3.  Hypercholesterolemia.

4.  Hypertension.

5.  Status post colectomy.



PLAN:  Continue vancomycin and Questran as needed for diarrhea.





__________________________________________

Brina Diaz MD





DD:  06/21/2018 16:10:05

DT:  06/21/2018 16:11:21

Job # [id number]

## 2018-06-22 RX ADMIN — SUCRALFATE SCH GM: 1 SUSPENSION ORAL at 11:04

## 2018-06-22 RX ADMIN — VANCOMYCIN HYDROCHLORIDE SCH MG: 500 INJECTION, POWDER, LYOPHILIZED, FOR SOLUTION INTRAVENOUS at 08:42

## 2018-06-22 RX ADMIN — VANCOMYCIN HYDROCHLORIDE SCH MG: 500 INJECTION, POWDER, LYOPHILIZED, FOR SOLUTION INTRAVENOUS at 21:19

## 2018-06-22 RX ADMIN — SUCRALFATE SCH GM: 1 SUSPENSION ORAL at 17:02

## 2018-06-22 RX ADMIN — VANCOMYCIN HYDROCHLORIDE SCH MG: 500 INJECTION, POWDER, LYOPHILIZED, FOR SOLUTION INTRAVENOUS at 17:05

## 2018-06-22 RX ADMIN — SUCRALFATE SCH GM: 1 SUSPENSION ORAL at 06:46

## 2018-06-22 RX ADMIN — MOMETASONE FUROATE SCH MCG: 220 INHALANT RESPIRATORY (INHALATION) at 17:02

## 2018-06-22 RX ADMIN — VANCOMYCIN HYDROCHLORIDE SCH MG: 500 INJECTION, POWDER, LYOPHILIZED, FOR SOLUTION INTRAVENOUS at 13:00

## 2018-06-23 RX ADMIN — ALUMINUM HYDROXIDE, MAGNESIUM HYDROXIDE, AND SIMETHICONE PRN ML: 200; 200; 20 SUSPENSION ORAL at 09:12

## 2018-06-23 RX ADMIN — SUCRALFATE SCH GM: 1 SUSPENSION ORAL at 16:31

## 2018-06-23 RX ADMIN — SUCRALFATE SCH GM: 1 SUSPENSION ORAL at 10:02

## 2018-06-23 RX ADMIN — SUCRALFATE SCH GM: 1 SUSPENSION ORAL at 05:20

## 2018-06-23 RX ADMIN — VANCOMYCIN HYDROCHLORIDE SCH MG: 500 INJECTION, POWDER, LYOPHILIZED, FOR SOLUTION INTRAVENOUS at 17:23

## 2018-06-23 RX ADMIN — VANCOMYCIN HYDROCHLORIDE SCH MG: 500 INJECTION, POWDER, LYOPHILIZED, FOR SOLUTION INTRAVENOUS at 09:14

## 2018-06-23 RX ADMIN — ALUMINUM HYDROXIDE, MAGNESIUM HYDROXIDE, AND SIMETHICONE PRN ML: 200; 200; 20 SUSPENSION ORAL at 17:58

## 2018-06-23 RX ADMIN — VANCOMYCIN HYDROCHLORIDE SCH MG: 500 INJECTION, POWDER, LYOPHILIZED, FOR SOLUTION INTRAVENOUS at 21:01

## 2018-06-23 RX ADMIN — VANCOMYCIN HYDROCHLORIDE SCH MG: 500 INJECTION, POWDER, LYOPHILIZED, FOR SOLUTION INTRAVENOUS at 13:30

## 2018-06-23 RX ADMIN — MOMETASONE FUROATE SCH MCG: 220 INHALANT RESPIRATORY (INHALATION) at 17:25

## 2018-06-24 RX ADMIN — MOMETASONE FUROATE SCH MCG: 220 INHALANT RESPIRATORY (INHALATION) at 17:32

## 2018-06-24 RX ADMIN — SUCRALFATE SCH: 1 SUSPENSION ORAL at 10:15

## 2018-06-24 RX ADMIN — SUCRALFATE SCH GM: 1 SUSPENSION ORAL at 16:27

## 2018-06-24 RX ADMIN — SUCRALFATE SCH GM: 1 SUSPENSION ORAL at 05:45

## 2018-06-24 RX ADMIN — ALUMINUM HYDROXIDE, MAGNESIUM HYDROXIDE, AND SIMETHICONE PRN ML: 200; 200; 20 SUSPENSION ORAL at 16:30

## 2018-06-24 NOTE — CP.PCM.PN
Subjective





- Date & Time of Evaluation


Date of Evaluation: 06/24/18


Time of Evaluation: 11:30





- Subjective


Subjective: 


Patient seen and examined bedside. Feeling better . Denies any more episodes of 

diarrhea. Had 2 normal BM today. Denies any abdominal pain or discomfort . 

Hemodynamically stable, afebrile. No acute issues overnight.


complains of pain and discomfort to left calf where vein stripping was done





Objective





- Vital Signs/Intake and Output


Vital Signs (last 24 hours): 


 











Temp Pulse Resp BP Pulse Ox


 


 98.2 F   78   20   129/67   100 


 


 06/24/18 08:22  06/24/18 09:19  06/24/18 08:22  06/24/18 09:19  06/24/18 08:22











- Medications


Medications: 


 Current Medications





Al Hydrox/Mg Hydrox/Simethicone (Maalox Plus 30 Ml)  30 ml PO TID PRN


   PRN Reason: Heartburn


   Last Admin: 06/23/18 17:58 Dose:  30 ml


Albuterol/Ipratropium (Duoneb 3 Mg/0.5 Mg (3 Ml) Ud)  3 ml INH Q4 PRN


   PRN Reason: Shortness of Breath


Ascorbic Acid (Vitamin C 250 Mg Tab)  250 mg PO DAILY [facility]


   Last Admin: 06/24/18 09:18 Dose:  250 mg


Aspirin (Ecotrin)  81 mg PO DAILY [facility]


   Last Admin: 06/24/18 09:18 Dose:  81 mg


Atorvastatin Calcium (Lipitor)  20 mg PO HS [facility]


   Last Admin: 06/23/18 21:00 Dose:  20 mg


Cholestyramine Resin (Questran)  4 gm PO 1100,2300 PRN


   PRN Reason: Loose stools


Clopidogrel Bisulfate (Plavix)  75 mg PO DAILY [facility]


   Last Admin: 06/24/18 09:18 Dose:  75 mg


Famotidine (Pepcid)  20 mg PO BID [facility]


   Last Admin: 06/24/18 09:18 Dose:  20 mg


Ferrous Sulfate (Feosol)  325 mg PO DAILY [facility]


   Last Admin: 06/24/18 09:20 Dose:  325 mg


Guaifenesin (Mucinex La)  600 mg PO Q12 PRN


   PRN Reason: Cough


   Last Admin: 06/20/18 08:32 Dose:  600 mg


Loratadine (Claritin)  10 mg PO DAILY [facility]


   Last Admin: 06/24/18 09:19 Dose:  10 mg


Meclizine HCl (Antivert)  12.5 mg PO Q8 PRN


   PRN Reason: Dizziness


Metoprolol Tartrate (Lopressor)  50 mg PO Q12 [facility]


   Last Admin: 06/24/18 09:19 Dose:  50 mg


Mometasone Furoate (Asmanex Twisthaler 220 Mcg)  220 puff INH QPM [facility]


   Last Admin: 06/23/18 17:25 Dose:  1 mcg


Sucralfate (Carafate Oral Susp)  1 gm PO TID@0600,1000,1600 [facility]


   Last Admin: 06/24/18 10:15 Dose:  Not Given











- Constitutional


Appears: Non-toxic, No Acute Distress





- Head Exam


Head Exam: ATRAUMATIC, NORMOCEPHALIC





- Eye Exam


Eye Exam: EOMI, PERRL


Pupil Exam: NORMAL ACCOMODATION





- ENT Exam


ENT Exam: Mucous Membranes Moist, Normal Exam





- Neck Exam


Neck Exam: Full ROM, Normal Inspection





- Respiratory Exam


Respiratory Exam: Clear to Ausculation Bilateral, NORMAL BREATHING PATTERN.  

absent: Rales, Rhonchi, Wheezes





- Cardiovascular Exam


Cardiovascular Exam: REGULAR RHYTHM, +S1, +S2.  absent: JVD





- GI/Abdominal Exam


GI & Abdominal Exam: Soft, Normal Bowel Sounds.  absent: Distended, Guarding, 

Tenderness, Rebound





- Rectal Exam


Rectal Exam: Deferred





- Extremities Exam


Extremities Exam: Normal Inspection.  absent: Calf Tenderness





- Neurological Exam


Neurological Exam: Alert, Awake, CN II-XII Intact, Oriented x3





- Psychiatric Exam


Psychiatric exam: Flat Affect





- Skin


Skin Exam: Dry, Pallor, Warm





Assessment and Plan





- Assessment and Plan (Free Text)


Assessment: 


82 y/o male with PMH HTN , dyslipidemia ,recent CABG admitted to TCU unit for 

general deconditioning, physical therapy and treatment of C.Diff colitis


At present patient doing well, denies any more diarrhea.





1. C.diff colitis


has been on Vanco Po since  6/14 and has no more diarrhea episodes 


Will not renew vancomycin 


Repeat c.diff in stool


maintain contact isolation 





2. HTN


controlled 


Continue current treatment 


on Metoprolol 





3.Post CABG


Continue ASA, statin, plavix and beta blockers








5.Dyslipidemia


on statin








6. Anemia


chronic 


on ferrous sulfate 








7. DVT prophylaxis


 SCD

## 2018-06-25 VITALS — OXYGEN SATURATION: 99 %

## 2018-06-25 RX ADMIN — MOMETASONE FUROATE SCH MCG: 220 INHALANT RESPIRATORY (INHALATION) at 18:24

## 2018-06-25 RX ADMIN — SUCRALFATE SCH GM: 1 SUSPENSION ORAL at 06:11

## 2018-06-25 RX ADMIN — ALUMINUM HYDROXIDE, MAGNESIUM HYDROXIDE, AND SIMETHICONE PRN ML: 200; 200; 20 SUSPENSION ORAL at 12:16

## 2018-06-25 RX ADMIN — SUCRALFATE SCH GM: 1 SUSPENSION ORAL at 10:51

## 2018-06-25 RX ADMIN — SUCRALFATE SCH GM: 1 SUSPENSION ORAL at 15:30

## 2018-06-26 VITALS — DIASTOLIC BLOOD PRESSURE: 67 MMHG | SYSTOLIC BLOOD PRESSURE: 135 MMHG | HEART RATE: 70 BPM

## 2018-06-26 VITALS — TEMPERATURE: 97 F

## 2018-06-26 RX ADMIN — GUAIFENESIN PRN MG: 600 TABLET, EXTENDED RELEASE ORAL at 08:20

## 2018-06-26 RX ADMIN — SUCRALFATE SCH GM: 1 SUSPENSION ORAL at 05:20

## 2018-06-26 RX ADMIN — SUCRALFATE SCH GM: 1 SUSPENSION ORAL at 10:00

## 2019-02-01 ENCOUNTER — HOSPITAL ENCOUNTER (EMERGENCY)
Dept: HOSPITAL 14 - H.ER | Age: 84
LOS: 1 days | Discharge: HOME | End: 2019-02-02
Payer: MEDICARE

## 2019-02-01 VITALS — RESPIRATION RATE: 18 BRPM | OXYGEN SATURATION: 98 %

## 2019-02-01 VITALS — BODY MASS INDEX: 23.7 KG/M2

## 2019-02-01 DIAGNOSIS — J45.909: ICD-10-CM

## 2019-02-01 DIAGNOSIS — K44.9: ICD-10-CM

## 2019-02-01 DIAGNOSIS — I10: ICD-10-CM

## 2019-02-01 DIAGNOSIS — K52.9: Primary | ICD-10-CM

## 2019-02-01 LAB
ALBUMIN SERPL-MCNC: 4.5 G/DL (ref 3.5–5)
ALBUMIN/GLOB SERPL: 1.1 {RATIO} (ref 1–2.1)
ALT SERPL-CCNC: 44 U/L (ref 21–72)
APTT BLD: 36.1 SECONDS (ref 25.6–37.1)
AST SERPL-CCNC: 31 U/L (ref 17–59)
BASOPHILS # BLD AUTO: 0.1 K/UL (ref 0–0.2)
BASOPHILS NFR BLD: 0.9 % (ref 0–2)
BUN SERPL-MCNC: 19 MG/DL (ref 9–20)
CALCIUM SERPL-MCNC: 9.6 MG/DL (ref 8.4–10.2)
EOSINOPHIL # BLD AUTO: 0.1 K/UL (ref 0–0.7)
EOSINOPHIL NFR BLD: 0.9 % (ref 0–4)
ERYTHROCYTE [DISTWIDTH] IN BLOOD BY AUTOMATED COUNT: 14.7 % (ref 11.5–14.5)
GFR NON-AFRICAN AMERICAN: 53
HGB BLD-MCNC: 15 G/DL (ref 12–18)
INR PPP: 1.1
LIPASE SERPL-CCNC: 47 U/L (ref 23–300)
LYMPHOCYTES # BLD AUTO: 1.3 K/UL (ref 1–4.3)
LYMPHOCYTES NFR BLD AUTO: 17.4 % (ref 20–40)
MCH RBC QN AUTO: 29.3 PG (ref 27–31)
MCHC RBC AUTO-ENTMCNC: 33.9 G/DL (ref 33–37)
MCV RBC AUTO: 86.4 FL (ref 80–94)
MONOCYTES # BLD: 0.9 K/UL (ref 0–0.8)
MONOCYTES NFR BLD: 11.7 % (ref 0–10)
NEUTROPHILS # BLD: 5.1 K/UL (ref 1.8–7)
NEUTROPHILS NFR BLD AUTO: 69.1 % (ref 50–75)
NRBC BLD AUTO-RTO: 0.1 % (ref 0–0)
PLATELET # BLD: 196 K/UL (ref 130–400)
PMV BLD AUTO: 8.6 FL (ref 7.2–11.7)
PROTHROMBIN TIME: 12.7 SECONDS (ref 9.8–13.1)
RBC # BLD AUTO: 5.12 MIL/UL (ref 4.4–5.9)
WBC # BLD AUTO: 7.3 K/UL (ref 4.8–10.8)

## 2019-02-01 PROCEDURE — 85610 PROTHROMBIN TIME: CPT

## 2019-02-01 PROCEDURE — 86850 RBC ANTIBODY SCREEN: CPT

## 2019-02-01 PROCEDURE — 80053 COMPREHEN METABOLIC PANEL: CPT

## 2019-02-01 PROCEDURE — 96375 TX/PRO/DX INJ NEW DRUG ADDON: CPT

## 2019-02-01 PROCEDURE — 85025 COMPLETE CBC W/AUTO DIFF WBC: CPT

## 2019-02-01 PROCEDURE — 99283 EMERGENCY DEPT VISIT LOW MDM: CPT

## 2019-02-01 PROCEDURE — 86900 BLOOD TYPING SEROLOGIC ABO: CPT

## 2019-02-01 PROCEDURE — 74177 CT ABD & PELVIS W/CONTRAST: CPT

## 2019-02-01 PROCEDURE — 96374 THER/PROPH/DIAG INJ IV PUSH: CPT

## 2019-02-01 PROCEDURE — 83690 ASSAY OF LIPASE: CPT

## 2019-02-01 PROCEDURE — 85730 THROMBOPLASTIN TIME PARTIAL: CPT

## 2019-02-01 NOTE — ED PDOC
HPI: Abdomen


Time Seen by Provider: 02/01/19 21:04


Chief Complaint (Nursing): GI Problem


Chief Complaint (Provider): Abdominal Pain, Nausea, Vomiting


History Per: Patient


History/Exam Limitations: no limitations


Onset/Duration Of Symptoms: Hrs (x2)


Current Symptoms Are (Timing): Still Present


Additional Complaint(s): 


83 year old  male with extensive pmhx including CAD, colon CA, and HTN 

presents to the ED for evaluation of severe nausea and five small episodes of 

coffee ground vomit associated with epigastric pain described as heart burn for 

the past two hours. Patient also notes having diarrhea that is black in color. 

Otherwise, denies fever, cough, and shortness of breath.





PMD: none provided





Past Medical History


Reviewed: Historical Data, Nursing Documentation, Vital Signs


Vital Signs: 





                                Last Vital Signs











Temp  98.4 F   02/01/19 20:59


 


Pulse  86   02/01/19 21:49


 


Resp  18   02/01/19 21:49


 


BP  186/92 H  02/01/19 21:49


 


Pulse Ox  98   02/01/19 21:49














- Medical History


PMH: Asthma, Bronchitis, CAD, HTN, Hypercholesterolemia, Malignancy (colon CA), 

Peripheral Edema, Pneumonia (2011)


   Denies: HIV, Chronic Kidney Disease





- Surgical History


Surgical History: Appendectomy, CABG, Cholecystectomy, Endoscopy, Hernia Repair


Other surgeries: colon resection





- Family History


Family History: States: Unknown Family Hx





- Social History


Current smoker - smoking cessation education provided: No


Alcohol: None


Drugs: Denies





- Immunization History


Hx Tetanus Toxoid Vaccination: Yes


Hx Influenza Vaccination: Yes


Hx Pneumococcal Vaccination: Yes





- Home Medications


Home Medications: 


                                Ambulatory Orders











 Medication  Instructions  Recorded


 


Rosuvastatin Calcium [Crestor] 10 mg PO HS 12/11/15


 


Aspirin [Ecotrin] 81 mg PO DAILY 05/07/18


 


Loratadine [Claritin] 10 mg PO DAILY 05/07/18


 


traMADol [Ultram] 50 mg PO Q6 PRN 05/07/18


 


Albuterol/Ipratropium [Duoneb 3 3 ml PO Q4 PRN 06/14/18





mg/0.5 mg (3 ml) UD]  


 


Ascorbic Acid [Vitamin C 250 mg 250 mg PO DAILY 06/14/18





Tab]  


 


Beclomethasone Dipropionate [Qvar 1 puff IH Q12 06/14/18





40 mcg]  


 


Cholecalciferol [Vitamin D 1000 IU] 1,000 unit PO DAILY 06/14/18


 


Clopidogrel [Plavix] 75 mg PO DAILY 06/14/18


 


Ferrous Sulfate [Feosol] 325 mg PO DAILY 06/14/18


 


Mag Hydrox/Aluminum Hyd/Simeth 30 ml PO TID PRN 06/14/18





[Maalox Advanced Suspension]  


 


Magnesium Hydroxide [Milk Of 30 ml PO DAILY PRN 06/14/18





Magnesia]  


 


Meclizine [Antivert] 12.5 mg PO Q8 PRN 06/14/18


 


Metoprolol Tartrate [Lopressor] 50 mg PO Q12 06/14/18


 


guaiFENesin [Mucinex LA] 600 mg PO Q12 PRN 06/14/18


 


Cholestyramine [Questran] 4 gm PO 1100,2300 PRN  packet 06/18/18


 


Famotidine [Pepcid] 20 mg PO BID  tab 06/18/18


 


Mometasone [Asmanex Twisthaler 220 220 puff INH QPM  inh 06/18/18





MCG]  


 


Saccharomyces Boulardi [Florastor] 250 mg PO BID  cap 06/18/18


 


Atorvastatin [Lipitor] 20 mg PO HS #30 tab 06/26/18


 


Sucralfate [Carafate Oral Susp] 1 gm PO TID #30 udc 06/26/18


 


Dicyclomine [Bentyl] 20 mg PO Q12 PRN #20 tab 02/02/19


 


Ondansetron ODT [Zofran ODT] 4 mg PO Q6 PRN #8 odt 02/02/19














- Allergies


Allergies/Adverse Reactions: 


                                    Allergies











Allergy/AdvReac Type Severity Reaction Status Date / Time


 


diphenhydramine HCl AdvReac  ANAPHYLAXIS Verified 02/01/19 20:59





[From Benadryl]     














Review of Systems


ROS Statement: Except As Marked, All Systems Reviewed And Found Negative


Constitutional: Negative for: Fever


Respiratory: Negative for: Cough, Shortness of Breath


Gastrointestinal: Positive for: Nausea, Vomiting (described as coffee ground 

like), Abdominal Pain (epigastric, described as heart burn), Diarrhea (black in 

color)





Physical Exam





- Reviewed


Nursing Documentation Reviewed: Yes


Vital Signs Reviewed: Yes





- Physical Exam


Appears: Positive for: No Acute Distress


Head Exam: Positive for: ATRAUMATIC, NORMOCEPHALIC


Skin: Positive for: Normal Color, Warm, DRY


Eye Exam: Positive for: EOMI, Normal appearance, PERRL


ENT: Positive for: Normal ENT Inspection


Neck: Positive for: Normal, Painless ROM, Supple


Cardiovascular/Chest: Positive for: Regular Rate, Rhythm


Respiratory: Positive for: Normal Breath Sounds.  Negative for: Respiratory 

Distress


Gastrointestinal/Abdominal: Positive for: Soft, Tenderness (mild epigastric 

tenderness)


Back: Positive for: Normal Inspection


Extremity: Positive for: Normal ROM (all extremities)


Neurologic/Psych: Positive for: Alert, Oriented (x3).  Negative for: 

Motor/Sensory Deficits





- Laboratory Results


Result Diagrams: 


                                 02/01/19 21:30





                                 02/01/19 21:30





- ECG


O2 Sat by Pulse Oximetry: 98 (RA)


Pulse Ox Interpretation: Normal





Medical Decision Making


Medical Decision Making: 


Time: 2111


Initial Impression: 83 year old male with coffee ground emesis and epigastric 

pain


Initial Plan: 


--ABO/RH type


--Type and screen


--Abd and pelvic CT IV contrast


--CMP


--Lipase


--CBC with differential


--PTT / PT


--Normal saline IV


--Protonix 80mg IV 


--Zofran 4mg IV





--------------------------------------

----------------------------------------------------------


Scribe Attestation:


Documented by Randa Hanna acting as a scribe for Albino Torres MD.





Provider Scribe Attestation:


All medical record entries made by the Scribe were at my direction and 

personally dictated by me. I have reviewed the chart and agree that the record 

accurately reflects my personal performance of the history, physical exam, 

medical decision making, and the department course for this patient. I have also

personally directed, reviewed, and agree with the discharge instructions and 

disposition.


***********************

********************************************************************************


************


Time: 2333


CT RESULTS


FINDINGS: 





LUNG BASES: 


The lung bases appear clear. No pleural effusions are seen. 





LIVER: 


Unremarkable. 





GALLBLADDER AND BILE DUCTS: 


Status post cholecystectomy. No radioopaque gallstones are seen. No biliary 

ductal dilatation is evident. 





PANCREAS: 


A 1.0 cm ovoid shaped zone of decreased attenuation is seen in the mid anterior 

body of the pancreas thought consistent with a small lipoma. 





SPLEEN: 


Unremarkable. 





ADRENAL GLANDS: 


Unremarkable. 





KIDNEYS, URETERS, AND BLADDER: 


A 1.7 cm cortical cyst arises from the postero-medial mid right renal cortex.  

Otherwise, the kidneys appear within normal limits. There is no hydronephrosis 

or hydroureter. No urinary calculi are seen. 





STOMACH AND BOWEL: 


A moderate-large sized hiatal hernia is present. No evidence of bowel 

obstruction. Mild mucosal wall thickening throughout the small intestinal tract 

suggesting non--specific enteritis.  Inflammatory or infectious etiologies are 

thought most likely. Pericolonic surgical sutures are seen in the region of the 

rectosigmoid junction from prior surgical anastomosis. 





APPENDIX: 


Status post appendectomy 





PERITONEUM: 


No free fluid. No free air. 





LYMPH NODES: 


No lymphadenopathy is evident. 





REPRODUCTIVE: 


Unremarkable as visualized. 





VASCULATURE: 


No evidence of abdominal aortic aneurysm. Extensive atherosclerotic vascular 

plaquing is present. 





ABDOMINAL WALL: 


Surgical coils are seen in the right anterolateral abdominal wall consistent 

with the presence of a ventral herniorrhaphy mesh. 





BONES: 


No aggressive appearing osseous lesion. No acute osseous pathology evident. 

There is mild dextroscoliotic curvature of the lower lumbar spine with the apex 

at L4. There is evidence of some degenerative disc disease at L4-5 and L5-S1. 





IMPRESSION: 


1.  A moderate-large sized hiatal hernia noted. 


2.  Diffuse enteritis. 


3.  Status post appendectomy. 


4.  A right anterolateral abdominal wall ventral herniorrhaphy mesh is present. 


5.  Surgical anastomosis at the recto-sigmoid junction. 


6.  Extensive atherosclerotic vascular plaquing. 


7.  Incidental discovery of a 1.0 cm lipoma in the anterior mid body of the 

pancreas. 


8.  Evidence of degenerative disc disease at L4-5 and L5-S1.


 


Electronically signed on Feb 1, 2019 11:33:05 PM EST by:


Andrea Rogers M.D., MBA Certified By [facility] & [facility]


Fellowship Trained MRI and CT Specialist





Time: 0056


-- Labs reviewed and demonstrate no clinically significant abnormalities. On re-

evaluation, patient reports he has not had any episodes of vomiting or diarrhea 

while in the ED. Patient is stable upon discharge home with a diagnosis of 

gastroenteritis. 


________

________________________________________________________________________________


_____


Scribe Attestation:


Documented by Toan Carranza, acting as a scribe for Albino Torres MD.





Provider Scribe Attestation:


All medical record entries made by the Scribe were at my direction and 

personally dictated by me. I have reviewed the chart and agree that the record 

accurately reflects my personal performance of the history, physical exam, 

medical decision making, and the department course for this patient. I have also

personally directed, reviewed, and agree with the discharge instructions and 

disposition.





Disposition





- Clinical Impression


Clinical Impression: 


 Gastroenteritis








- Patient ED Disposition


Is Patient to be Admitted: No


Counseled Patient/Family Regarding: Studies Performed, Diagnosis, Need For 

Followup, Rx Given





- Disposition


Referrals: 


Brina Diaz MD [Family Provider] - 


Disposition: Routine/Home


Disposition Time: 00:56


Condition: STABLE


Prescriptions: 


Dicyclomine [Bentyl] 20 mg PO Q12 PRN #20 tab


 PRN Reason: abdominal pain/diarrhea


Ondansetron ODT [Zofran ODT] 4 mg PO Q6 PRN #8 odt


 PRN Reason: Nausea/Vomiting


Instructions:  Viral Gastroenteritis


Forms:  [facility] (English)


Print Language: Fijian

## 2019-02-02 VITALS — HEART RATE: 90 BPM | TEMPERATURE: 98.1 F | SYSTOLIC BLOOD PRESSURE: 143 MMHG | DIASTOLIC BLOOD PRESSURE: 73 MMHG

## 2019-02-02 NOTE — CT
Date of service: 02/01/2019



PROCEDURE:  CT Abdomen and Pelvis with contrast



HISTORY:

abd pain



COMPARISON:

CT abdomen and pelvis with IV contrast performed 6/14/18



TECHNIQUE:

Contrast dose: 90 mL Omnipaque 300 IV



Radiation dose:



Total exam DLP = 640.1 mGy-cm.



This CT exam was performed using one or more of the following dose 

reduction techniques: Automated exposure control, adjustment of the 

mA and/or kV according to patient size, and/or use of iterative 

reconstruction technique.



FINDINGS:



LOWER THORAX:

No visible consolidation, pleural effusion, or pneumothorax.  

Partially imaged cardiomegaly.  Median sternotomy wires. 



Moderate to large hiatal hernia. 



LIVER:

Unremarkable. 



GALLBLADDER AND BILE DUCTS:

Cholecystectomy. 



PANCREAS:

8 mm ovoid hypodense focus within the anterior mid pancreatic body 

appears fat density, possibly lipoma. 



SPLEEN:

Unremarkable. 



ADRENALS:

Unremarkable.



KIDNEYS AND URETERS:

The kidneys enhance symmetrically. No hydronephrosis or obstructing 

calculus identified.  Too small to characterize 9 mm left renal 

hypodensity.  18 mm right renal hypodensity measures approximately 7 

HU consistent with a cyst. 



VASCULATURE:

No aortic aneurysm.  Atherosclerotic calcifications of the aorta and 

branches. 



BOWEL:

Stomach is nondistended.  



Lack of oral contrast limits evaluation for bowel pathology.  Bowel 

loops appear within normal limits of caliber without evidence of 

obstruction.  Anastomotic bowel suture material. 



APPENDIX:

The appendix is not identified.  Surgical clips are noted near the 

cecum which may be related to appendectomy.  No secondary signs of 

acute appendicitis.



PERITONEUM:

No significant free fluid.  No definite free air. 



LYMPH NODES:

No bulky adenopathy identified. 



BLADDER:

Unremarkable. 



REPRODUCTIVE:

The prostate gland appears heterogeneous, contain calcifications, and 

measures approximately 3.4 x 4.1 cm. 



BONES:

Degenerative changes. 



OTHER FINDINGS:

Evidence of prior anterior abdominal wall hernia repair.



IMPRESSION:

Moderate to large hiatal hernia. 



8 mm ovoid hypodense focus within the anterior mid pancreatic body 

appears fat density, possibly lipoma. 



Too small to characterize 9 mm left renal hypodensity.  18 mm right 

renal hypodensity measures approximately 7 HU consistent with a cyst. 



Heterogeneous prostate gland measures approximately 3.4 x 4.1 cm.  

Recommend correlation with PSA. 



Additional findings as above. 



Preliminary impression was provided by [facility].

## 2019-04-15 ENCOUNTER — HOSPITAL ENCOUNTER (EMERGENCY)
Dept: HOSPITAL 14 - H.ER | Age: 84
LOS: 1 days | Discharge: HOME | End: 2019-04-16
Payer: COMMERCIAL

## 2019-04-15 VITALS — BODY MASS INDEX: 23.7 KG/M2

## 2019-04-15 DIAGNOSIS — Z88.8: ICD-10-CM

## 2019-04-15 DIAGNOSIS — J45.909: ICD-10-CM

## 2019-04-15 DIAGNOSIS — Z85.038: ICD-10-CM

## 2019-04-15 DIAGNOSIS — Z95.1: ICD-10-CM

## 2019-04-15 DIAGNOSIS — I10: ICD-10-CM

## 2019-04-15 DIAGNOSIS — R10.13: Primary | ICD-10-CM

## 2019-04-15 LAB
BASOPHILS # BLD AUTO: 0 K/UL (ref 0–0.2)
BASOPHILS NFR BLD: 0.5 % (ref 0–2)
EOSINOPHIL # BLD AUTO: 0.3 K/UL (ref 0–0.7)
EOSINOPHIL NFR BLD: 3.2 % (ref 0–4)
ERYTHROCYTE [DISTWIDTH] IN BLOOD BY AUTOMATED COUNT: 15.7 % (ref 11.5–14.5)
HGB BLD-MCNC: 14.7 G/DL (ref 12–18)
LYMPHOCYTES # BLD AUTO: 1.4 K/UL (ref 1–4.3)
LYMPHOCYTES NFR BLD AUTO: 15.5 % (ref 20–40)
MCH RBC QN AUTO: 29.2 PG (ref 27–31)
MCHC RBC AUTO-ENTMCNC: 33.8 G/DL (ref 33–37)
MCV RBC AUTO: 86.3 FL (ref 80–94)
MONOCYTES # BLD: 1.1 K/UL (ref 0–0.8)
MONOCYTES NFR BLD: 12.5 % (ref 0–10)
NEUTROPHILS # BLD: 6.2 K/UL (ref 1.8–7)
NEUTROPHILS NFR BLD AUTO: 68.3 % (ref 50–75)
NRBC BLD AUTO-RTO: 0.2 % (ref 0–0)
PLATELET # BLD: 218 K/UL (ref 130–400)
PMV BLD AUTO: 8.5 FL (ref 7.2–11.7)
RBC # BLD AUTO: 5.03 MIL/UL (ref 4.4–5.9)
WBC # BLD AUTO: 9.1 K/UL (ref 4.8–10.8)

## 2019-04-15 PROCEDURE — 85025 COMPLETE CBC W/AUTO DIFF WBC: CPT

## 2019-04-15 PROCEDURE — 87040 BLOOD CULTURE FOR BACTERIA: CPT

## 2019-04-15 PROCEDURE — 83605 ASSAY OF LACTIC ACID: CPT

## 2019-04-15 PROCEDURE — 87804 INFLUENZA ASSAY W/OPTIC: CPT

## 2019-04-15 PROCEDURE — 93005 ELECTROCARDIOGRAM TRACING: CPT

## 2019-04-15 PROCEDURE — 80053 COMPREHEN METABOLIC PANEL: CPT

## 2019-04-15 PROCEDURE — 96374 THER/PROPH/DIAG INJ IV PUSH: CPT

## 2019-04-15 PROCEDURE — 99283 EMERGENCY DEPT VISIT LOW MDM: CPT

## 2019-04-15 PROCEDURE — 71045 X-RAY EXAM CHEST 1 VIEW: CPT

## 2019-04-15 NOTE — ED PDOC
HPI: Abdomen


Time Seen by Provider: 04/15/19 21:34


Chief Complaint (Nursing): GI Problem


Chief Complaint (Provider): vomiting


History Per: Patient


History/Exam Limitations: no limitations


Onset/Duration Of Symptoms: Days (1), Intermittent Episodes


Outside of [place] travel?: No


Current Symptoms Are (Timing): Better


Location Of Pain/Discomfort: Diffuse, Epigastric.  denies: Suprapubic


Quality Of Discomfort: "Pain"


Associated Symptoms: Chills, Nausea, Vomiting, Diarrhea (watery).  denies: 

Fever, Constipation, Urinary Symptoms


Exacerbating Factors: None


Alleviating Factors: None


Additional Complaint(s): 





also report shortness of breath, asthma exacerbation and chest heaviness.


Pt lives alone and according to daughter he has history of similar episodes and 

sometimes he gets scared being by himself





PMD Dr Diaz





Past Medical History


Reviewed: Historical Data, Nursing Documentation, Vital Signs





- Medical History


PMH: Asthma, Bronchitis, CAD, HTN, Hypercholesterolemia, Malignancy (colon CA), 

Peripheral Edema, Pneumonia (2011)


   Denies: HIV, Chronic Kidney Disease





- Surgical History


Surgical History: Appendectomy, CABG, Cholecystectomy, Endoscopy, Hernia Repair





- Family History


Family History: States: Unknown Family Hx





- Immunization History


Hx Tetanus Toxoid Vaccination: Yes


Hx Influenza Vaccination: Yes


Hx Pneumococcal Vaccination: Yes





- Home Medications


Home Medications: 


                                Ambulatory Orders











 Medication  Instructions  Recorded


 


Rosuvastatin Calcium [Crestor] 10 mg PO HS 12/11/15


 


Aspirin [Ecotrin] 81 mg PO DAILY 05/07/18


 


Loratadine [Claritin] 10 mg PO DAILY 05/07/18


 


traMADol [Ultram] 50 mg PO Q6 PRN 05/07/18


 


Albuterol/Ipratropium [Duoneb 3 3 ml PO Q4 PRN 06/14/18





mg/0.5 mg (3 ml) UD]  


 


Ascorbic Acid [Vitamin C 250 mg 250 mg PO DAILY 06/14/18





Tab]  


 


Beclomethasone Dipropionate [Qvar 1 puff IH Q12 06/14/18





40 mcg]  


 


Cholecalciferol [Vitamin D 1000 IU] 1,000 unit PO DAILY 06/14/18


 


Clopidogrel [Plavix] 75 mg PO DAILY 06/14/18


 


Ferrous Sulfate [Feosol] 325 mg PO DAILY 06/14/18


 


Mag Hydrox/Aluminum Hyd/Simeth 30 ml PO TID PRN 06/14/18





[Maalox Advanced Suspension]  


 


Magnesium Hydroxide [Milk Of 30 ml PO DAILY PRN 06/14/18





Magnesia]  


 


Meclizine [Antivert] 12.5 mg PO Q8 PRN 06/14/18


 


Metoprolol Tartrate [Lopressor] 50 mg PO Q12 06/14/18


 


guaiFENesin [Mucinex LA] 600 mg PO Q12 PRN 06/14/18


 


Cholestyramine [Questran] 4 gm PO 1100,2300 PRN  packet 06/18/18


 


Famotidine [Pepcid] 20 mg PO BID  tab 06/18/18


 


Mometasone [Asmanex Twisthaler 220 220 puff INH QPM  inh 06/18/18





MCG]  


 


Saccharomyces Boulardi [Florastor] 250 mg PO BID  cap 06/18/18


 


Atorvastatin [Lipitor] 20 mg PO HS #30 tab 06/26/18


 


Sucralfate [Carafate Oral Susp] 1 gm PO TID #30 udc 06/26/18


 


Dicyclomine [Bentyl] 20 mg PO Q12 PRN #20 tab 02/02/19


 


Ondansetron ODT [Zofran ODT] 4 mg PO Q6 PRN #8 odt 02/02/19














- Allergies


Allergies/Adverse Reactions: 


                                    Allergies











Allergy/AdvReac Type Severity Reaction Status Date / Time


 


diphenhydramine HCl AdvReac  ANAPHYLAXIS Verified 02/01/19 20:59





[From Benadryl]     














Review of Systems


ROS Statement: Except As Marked, All Systems Reviewed And Found Negative (and as

per HPI)


Constitutional: Positive for: Chills, Weakness, Malaise


Cardiovascular: Positive for: Chest Pain, Light Headedness


Respiratory: Positive for: Shortness of Breath


Gastrointestinal: Positive for: Nausea, Vomiting, Abdominal Pain, Diarrhea.  

Negative for: Melena, Hematemesis





Physical Exam





- Reviewed


Nursing Documentation Reviewed: Yes


Vital Signs Reviewed: Yes





- Physical Exam


Appears: Positive for: No Acute Distress


Head Exam: Positive for: ATRAUMATIC, NORMOCEPHALIC


Skin: Positive for: Warm, Dry


Eye Exam: Positive for: EOMI, PERRL


ENT: Positive for: Other (tacky mucus membranes)


Neck: Positive for: Painless ROM, Supple


Cardiovascular/Chest: Positive for: Regular Rate, Rhythm, Murmur


Respiratory: Positive for: Normal Breath Sounds.  Negative for: Respiratory 

Distress


Gastrointestinal/Abdominal: Positive for: Soft, Tenderness (mild epigastric).  

Negative for: Mass, Distended, Guarding, Rebound


Back: Positive for: Normal Inspection.  Negative for: Decreased ROM


Extremity: Positive for: Normal ROM.  Negative for: Deformity


Lymphatic: Negative for: Adenopathy


Neurological/Psych: Positive for: Awake, Alert.  Negative for: Motor/Sensory 

Deficits





- Laboratory Results


Result Diagrams: 


                                 04/15/19 23:20





                                 04/16/19 02:55





- Progress


ED Course And Treament: 





Reviewed previous chart and pt has similar presentation prior visit. Underwent 

CT which demonstrated enteritis, otherwise pt deemed stable and discharged.


Pt's current clinical presentation similarly benign. Labs ordered to r/o electro

lyte abnormality from fluid losses and dehydration, and meds for vomiting.





Disposition





- Clinical Impression


Clinical Impression: 


 Abdominal pain








- Disposition


Disposition: Transfer of Care


Disposition Time: 00:30


Condition: STABLE


Additional Instructions: 


follow up with your primary doctor Dr diaz in 1-2 days


return to the ED with any worsening or concerning symptoms


Patient Signed Over To: Abe Elise


Handoff Comments: Pending ER workup, reassessment and final ER disposition

## 2019-04-16 VITALS — DIASTOLIC BLOOD PRESSURE: 63 MMHG | TEMPERATURE: 98.3 F | HEART RATE: 77 BPM | SYSTOLIC BLOOD PRESSURE: 129 MMHG

## 2019-04-16 VITALS — RESPIRATION RATE: 18 BRPM

## 2019-04-16 LAB
ALBUMIN SERPL-MCNC: 4.2 G/DL (ref 3.5–5)
ALBUMIN/GLOB SERPL: 1.2 {RATIO} (ref 1–2.1)
ALT SERPL-CCNC: 35 U/L (ref 21–72)
AST SERPL-CCNC: 25 U/L (ref 17–59)
BUN SERPL-MCNC: 18 MG/DL (ref 9–20)
CALCIUM SERPL-MCNC: 9.3 MG/DL (ref 8.4–10.2)
GFR NON-AFRICAN AMERICAN: > 60

## 2019-04-16 NOTE — ED PDOC
- Laboratory Results


Result Diagrams: 


                                 04/15/19 23:20





                                 04/16/19 02:55





- ECG


O2 Sat by Pulse Oximetry: 96





Medical Decision Making


Medical Decision Making: 


signout from dr roque 


labs, po trial and reeval





labs and flu neg


tolerated po





Disposition


Counseled Patient/Family Regarding: Studies Performed, Diagnosis, Need For Fo

llowup





- Clinical Impression


Clinical Impression: 


 Abdominal pain








- POA


Present On Arrival: None





- Disposition


Disposition: Routine/Home


Disposition Time: 03:25


Condition: STABLE


Additional Instructions: 


follow up with your primary doctor Dr noland in 1-2 days


return to the ED with any worsening or concerning symptoms

## 2019-04-16 NOTE — CARD
--------------- APPROVED REPORT --------------





Date of service: 04/15/2019



EKG Measurement

Heart [id number]

[id number]

[id number]

[id number]

[id number]



<Conclusion>

Normal sinus rhythm

Left bundle branch block

Abnormal ECG

## 2019-04-16 NOTE — RAD
Date of service: 



04/15/2019



HISTORY:

 chest pain 



COMPARISON:

06/14/2018, 6:02 p.m.. 



TECHNIQUE:

1 view obtained.



FINDINGS:



LUNGS:

No active pulmonary disease.



PLEURA:

No significant pleural effusion identified, no pneumothorax apparent.



CARDIOVASCULAR:

No aortic atherosclerotic calcification present.



Normal cardiac size. No pulmonary vascular congestion. 



OSSEOUS STRUCTURES:

Sternotomy wires reiterated as well as prosthetic cardiac valve and 

post CABG changes.  Exostosis is again seen related to the distal 

right clavicle inferiorly.



VISUALIZED UPPER ABDOMEN:

Surgical clips reiterated right upper quadrant abdomen.



OTHER FINDINGS:

None.



IMPRESSION:

No interval acute cardiopulmonary disease appreciated.  Post CABG 

changes are reiterated as well as prosthetic cardiac valve.

## 2019-04-17 VITALS — OXYGEN SATURATION: 96 %
